# Patient Record
Sex: MALE | Race: WHITE | NOT HISPANIC OR LATINO | URBAN - METROPOLITAN AREA
[De-identification: names, ages, dates, MRNs, and addresses within clinical notes are randomized per-mention and may not be internally consistent; named-entity substitution may affect disease eponyms.]

---

## 2020-06-23 ENCOUNTER — OFFICE VISIT (OUTPATIENT)
Dept: URBAN - METROPOLITAN AREA CLINIC 107 | Facility: CLINIC | Age: 40
End: 2020-06-23

## 2020-07-24 ENCOUNTER — OFFICE VISIT (OUTPATIENT)
Dept: URBAN - METROPOLITAN AREA CLINIC 113 | Facility: CLINIC | Age: 40
End: 2020-07-24

## 2020-07-25 ENCOUNTER — TELEPHONE ENCOUNTER (OUTPATIENT)
Dept: URBAN - METROPOLITAN AREA CLINIC 13 | Facility: CLINIC | Age: 40
End: 2020-07-25

## 2020-07-25 RX ORDER — SACCHAROMYCES BOULARDII 250 MG
TAKE AS DIRECTED CAPSULE ORAL
Refills: 0 | OUTPATIENT
End: 2019-08-20

## 2020-07-25 RX ORDER — ACETYLCYSTEINE 600 MG
TAKE AS DIRECTED CAPSULE ORAL
Refills: 0 | OUTPATIENT
End: 2018-09-11

## 2020-07-25 RX ORDER — DEXTRIN 3 G/3.5 G
TAKE AS DIRECTED POWDER (GRAM) ORAL
Refills: 0 | OUTPATIENT
End: 2019-08-20

## 2020-07-26 ENCOUNTER — TELEPHONE ENCOUNTER (OUTPATIENT)
Dept: URBAN - METROPOLITAN AREA CLINIC 13 | Facility: CLINIC | Age: 40
End: 2020-07-26

## 2020-07-26 RX ORDER — SULFAMETHOXAZOLE AND TRIMETHOPRIM 800; 160 MG/1; MG/1
TAKE ONE TABLET BY MOUTH TWICE A DAY FOR 7 DAYS TABLET ORAL
Qty: 14 | Refills: 0 | Status: ACTIVE | COMMUNITY
Start: 2018-07-20

## 2020-07-26 RX ORDER — KETOCONAZOLE 20 MG/G
APPLY SPARINGLY TO AFFECTED AREA(S) ONCE DAILY CREAM TOPICAL
Refills: 0 | Status: ACTIVE | COMMUNITY
Start: 2019-09-30

## 2020-07-26 RX ORDER — CYCLOBENZAPRINE HYDROCHLORIDE 5 MG/1
TABLET, FILM COATED ORAL
Qty: 30 | Refills: 0 | Status: ACTIVE | COMMUNITY
Start: 2019-03-12

## 2020-07-26 RX ORDER — AMOXICILLIN 500 MG/1
TAKE ONE CAPSULE BY MOUTH THREE TIMES A DAY CAPSULE ORAL
Qty: 30 | Refills: 0 | Status: ACTIVE | COMMUNITY
Start: 2018-08-07

## 2020-07-26 RX ORDER — SODIUM FLUORIDE 6 MG/ML
PASTE, DENTIFRICE DENTAL
Qty: 100 | Refills: 0 | Status: ACTIVE | COMMUNITY
Start: 2019-07-24

## 2020-07-26 RX ORDER — LOTEPREDNOL ETABONATE AND TOBRAMYCIN 5; 3 MG/ML; MG/ML
INSTILL ONE DROP INTO EACH EYE THREE TIMES DAILY FOR 7 DAYS. SHAKE WEL SUSPENSION/ DROPS OPHTHALMIC
Qty: 5 | Refills: 0 | Status: ACTIVE | COMMUNITY
Start: 2019-11-12

## 2020-07-26 RX ORDER — AZITHROMYCIN DIHYDRATE 250 MG/1
TABLET, FILM COATED ORAL
Qty: 6 | Refills: 0 | Status: ACTIVE | COMMUNITY
Start: 2013-05-16

## 2020-07-26 RX ORDER — OFLOXACIN 3 MG/ML
SOLUTION AURICULAR (OTIC)
Qty: 10 | Refills: 0 | Status: ACTIVE | COMMUNITY
Start: 2014-05-14

## 2020-07-26 RX ORDER — ALBUTEROL SULFATE 90 UG/1
AEROSOL, METERED RESPIRATORY (INHALATION)
Qty: 8 | Refills: 0 | Status: ACTIVE | COMMUNITY
Start: 2020-02-19

## 2020-07-26 RX ORDER — PREDNISONE 20 MG/1
TABLET ORAL
Qty: 5 | Refills: 0 | Status: ACTIVE | COMMUNITY
Start: 2019-03-12

## 2020-07-26 RX ORDER — AMOXICILLIN 875 MG/1
TABLET, FILM COATED ORAL
Qty: 20 | Refills: 0 | Status: ACTIVE | COMMUNITY
Start: 2018-02-17

## 2020-09-03 ENCOUNTER — OFFICE VISIT (OUTPATIENT)
Dept: URBAN - METROPOLITAN AREA CLINIC 113 | Facility: CLINIC | Age: 40
End: 2020-09-03
Payer: COMMERCIAL

## 2020-09-03 ENCOUNTER — OFFICE VISIT (OUTPATIENT)
Dept: URBAN - METROPOLITAN AREA CLINIC 113 | Facility: CLINIC | Age: 40
End: 2020-09-03

## 2020-09-03 VITALS
BODY MASS INDEX: 23.52 KG/M2 | RESPIRATION RATE: 20 BRPM | HEART RATE: 72 BPM | SYSTOLIC BLOOD PRESSURE: 126 MMHG | HEIGHT: 71 IN | TEMPERATURE: 98.2 F | WEIGHT: 168 LBS | DIASTOLIC BLOOD PRESSURE: 79 MMHG

## 2020-09-03 DIAGNOSIS — K58.9 IRRITABLE BOWEL SYNDROME: ICD-10-CM

## 2020-09-03 PROCEDURE — G8427 DOCREV CUR MEDS BY ELIG CLIN: HCPCS | Performed by: INTERNAL MEDICINE

## 2020-09-03 PROCEDURE — 99213 OFFICE O/P EST LOW 20 MIN: CPT | Performed by: INTERNAL MEDICINE

## 2020-09-03 RX ORDER — PREDNISONE 20 MG/1
TABLET ORAL
Qty: 5 | Refills: 0 | Status: DISCONTINUED | COMMUNITY
Start: 2019-03-12

## 2020-09-03 RX ORDER — SULFAMETHOXAZOLE AND TRIMETHOPRIM 800; 160 MG/1; MG/1
TAKE ONE TABLET BY MOUTH TWICE A DAY FOR 7 DAYS TABLET ORAL
Qty: 14 | Refills: 0 | Status: DISCONTINUED | COMMUNITY
Start: 2018-07-20

## 2020-09-03 RX ORDER — AZITHROMYCIN DIHYDRATE 250 MG/1
TABLET, FILM COATED ORAL
Qty: 6 | Refills: 0 | Status: DISCONTINUED | COMMUNITY
Start: 2013-05-16

## 2020-09-03 RX ORDER — AMOXICILLIN 875 MG/1
TABLET, FILM COATED ORAL
Qty: 20 | Refills: 0 | Status: DISCONTINUED | COMMUNITY
Start: 2018-02-17

## 2020-09-03 RX ORDER — CYCLOBENZAPRINE HYDROCHLORIDE 5 MG/1
TABLET, FILM COATED ORAL
Qty: 30 | Refills: 0 | Status: DISCONTINUED | COMMUNITY
Start: 2019-03-12

## 2020-09-03 RX ORDER — AMOXICILLIN 500 MG/1
TAKE ONE CAPSULE BY MOUTH THREE TIMES A DAY CAPSULE ORAL
Qty: 30 | Refills: 0 | Status: DISCONTINUED | COMMUNITY
Start: 2018-08-07

## 2020-09-03 RX ORDER — ALBUTEROL SULFATE 90 UG/1
AEROSOL, METERED RESPIRATORY (INHALATION)
Qty: 8 | Refills: 0 | Status: ACTIVE | COMMUNITY
Start: 2020-02-19

## 2020-09-03 RX ORDER — RIFAXIMIN 550 MG/1
1 TABLET TABLET ORAL THREE TIMES A DAY
Qty: 42 TABLET | Refills: 0 | OUTPATIENT
Start: 2020-09-03 | End: 2020-09-17

## 2020-09-03 RX ORDER — LOTEPREDNOL ETABONATE AND TOBRAMYCIN 5; 3 MG/ML; MG/ML
INSTILL ONE DROP INTO EACH EYE THREE TIMES DAILY FOR 7 DAYS. SHAKE WEL SUSPENSION/ DROPS OPHTHALMIC
Qty: 5 | Refills: 0 | Status: ACTIVE | COMMUNITY
Start: 2019-11-12

## 2020-09-03 RX ORDER — KETOCONAZOLE 20 MG/G
APPLY SPARINGLY TO AFFECTED AREA(S) ONCE DAILY CREAM TOPICAL
Refills: 0 | Status: ACTIVE | COMMUNITY
Start: 2019-09-30

## 2020-09-03 RX ORDER — OFLOXACIN 3 MG/ML
SOLUTION AURICULAR (OTIC)
Qty: 10 | Refills: 0 | Status: DISCONTINUED | COMMUNITY
Start: 2014-05-14

## 2020-09-03 RX ORDER — SODIUM FLUORIDE 6 MG/ML
PASTE, DENTIFRICE DENTAL
Qty: 100 | Refills: 0 | Status: DISCONTINUED | COMMUNITY
Start: 2019-07-24

## 2020-09-03 NOTE — HPI-TODAY'S VISIT:
Mr. Mehta is a 39 year old male presenting for follow up. He has diarrhea with "sticky" stools.  He takes fiber, which is helpful.  His bottom is irritated due to the frequent stools.  He usually has 1 loose, but formed stool a day.  He has a history of anal fissure, with intermittent red blood per rectum.  He has avoided meat for the past month, which has provided some relief.  He has abdominal cramping immediately prior to defecation.  He denies any nausea, vomiting or heartburn. He takes a probiotic he buys at [employer].   Over the last 5 years, he has had the same symptoms wax and wane.  He has had labs, imaging, and colonoscopy with Dr. Montesinos.

## 2020-11-04 ENCOUNTER — WEB ENCOUNTER (OUTPATIENT)
Dept: URBAN - METROPOLITAN AREA CLINIC 113 | Facility: CLINIC | Age: 40
End: 2020-11-04

## 2020-11-04 ENCOUNTER — OFFICE VISIT (OUTPATIENT)
Dept: URBAN - METROPOLITAN AREA CLINIC 113 | Facility: CLINIC | Age: 40
End: 2020-11-04
Payer: COMMERCIAL

## 2020-11-04 VITALS
WEIGHT: 166 LBS | BODY MASS INDEX: 23.24 KG/M2 | HEIGHT: 71 IN | DIASTOLIC BLOOD PRESSURE: 81 MMHG | RESPIRATION RATE: 18 BRPM | HEART RATE: 65 BPM | SYSTOLIC BLOOD PRESSURE: 131 MMHG | TEMPERATURE: 98.4 F

## 2020-11-04 DIAGNOSIS — K58.9 IRRITABLE BOWEL SYNDROME: ICD-10-CM

## 2020-11-04 DIAGNOSIS — R19.5 LOOSE STOOLS: ICD-10-CM

## 2020-11-04 PROCEDURE — G8427 DOCREV CUR MEDS BY ELIG CLIN: HCPCS | Performed by: INTERNAL MEDICINE

## 2020-11-04 PROCEDURE — 99213 OFFICE O/P EST LOW 20 MIN: CPT | Performed by: INTERNAL MEDICINE

## 2020-11-04 RX ORDER — LOTEPREDNOL ETABONATE AND TOBRAMYCIN 5; 3 MG/ML; MG/ML
INSTILL ONE DROP INTO EACH EYE THREE TIMES DAILY FOR 7 DAYS. SHAKE WEL SUSPENSION/ DROPS OPHTHALMIC
Qty: 5 | Refills: 0 | Status: DISCONTINUED | COMMUNITY
Start: 2019-11-12

## 2020-11-04 RX ORDER — KETOCONAZOLE 20 MG/G
APPLY SPARINGLY TO AFFECTED AREA(S) ONCE DAILY CREAM TOPICAL
Refills: 0 | Status: DISCONTINUED | COMMUNITY
Start: 2019-09-30

## 2020-11-04 RX ORDER — ALBUTEROL SULFATE 90 UG/1
AEROSOL, METERED RESPIRATORY (INHALATION)
Qty: 8 | Refills: 0 | Status: ACTIVE | COMMUNITY
Start: 2020-02-19

## 2020-11-04 NOTE — HPI-TODAY'S VISIT:
The patient is a very pleasant 40-year-old gentleman who had been followed by Dr. Montesinos for chronic GI symptomatology.  He is undergone colonoscopy in 2014 which was unremarkable for either diverticulosis or Crohn's disease.  He had a CT scan in 2015 which was unremarkable.  He often has loose stools and sticky stools.  He was recently placed on Xifaxan which actually is led to a marked improvement in his symptoms.  He does take psyllium husk every day.  I cannot find where he was ever tested for celiac disease.  His only foreign travel was in 2013 to the [place].  He stated a resort at that time though.  He does not drink from a shallow Wells.

## 2020-11-04 NOTE — EXAM-PHYSICAL EXAM
He is alert and oriented to person place and situation no acute distress.  He has no scleral icterus.

## 2021-01-22 ENCOUNTER — LAB OUTSIDE AN ENCOUNTER (OUTPATIENT)
Dept: URBAN - METROPOLITAN AREA CLINIC 113 | Facility: CLINIC | Age: 41
End: 2021-01-22

## 2021-01-29 ENCOUNTER — OFFICE VISIT (OUTPATIENT)
Dept: URBAN - METROPOLITAN AREA CLINIC 113 | Facility: CLINIC | Age: 41
End: 2021-01-29
Payer: COMMERCIAL

## 2021-01-29 VITALS
HEART RATE: 69 BPM | SYSTOLIC BLOOD PRESSURE: 124 MMHG | TEMPERATURE: 97.7 F | HEIGHT: 71 IN | RESPIRATION RATE: 18 BRPM | WEIGHT: 174 LBS | DIASTOLIC BLOOD PRESSURE: 74 MMHG | BODY MASS INDEX: 24.36 KG/M2

## 2021-01-29 DIAGNOSIS — R13.14 PHARYNGOESOPHAGEAL DYSPHAGIA: ICD-10-CM

## 2021-01-29 PROBLEM — 40739000 DYSPHAGIA: Status: ACTIVE | Noted: 2021-01-29

## 2021-01-29 PROCEDURE — G8482 FLU IMMUNIZE ORDER/ADMIN: HCPCS | Performed by: NURSE PRACTITIONER

## 2021-01-29 PROCEDURE — 99214 OFFICE O/P EST MOD 30 MIN: CPT | Performed by: NURSE PRACTITIONER

## 2021-01-29 RX ORDER — ALBUTEROL SULFATE 90 UG/1
AEROSOL, METERED RESPIRATORY (INHALATION)
Qty: 8 | Refills: 0 | Status: ON HOLD | COMMUNITY
Start: 2020-02-19

## 2021-01-29 NOTE — HPI-TODAY'S VISIT:
39yo male presenting for evaluation of dysphagia.  He was last seen 11/4/20 by Dr. Thomas for a change in stool caliber. He was recommended an alternative fiber supplement, and celiac serologies were planned. Stool studies and/or an empiric course of metronidazole were considered.  Within the last two months, he has experienced progressive difficulty with swallowing. He indicates solid food will become lodged at the sternal notch, resulting in gagging with forced regurgitation. At times, he can facilitate passage with a sip of water. He denies GERD symptoms. He has chronic abdominal pain which has been attributed to irritable bowel syndrome. This has improved following a course of Xifaxan. He denies nausea or vomiting. He has one loose, nonbloody stool per day with psyllium husk and Align. He denies NSAID use.

## 2021-02-05 ENCOUNTER — OFFICE VISIT (OUTPATIENT)
Dept: URBAN - METROPOLITAN AREA CLINIC 113 | Facility: CLINIC | Age: 41
End: 2021-02-05

## 2021-02-15 ENCOUNTER — OFFICE VISIT (OUTPATIENT)
Dept: URBAN - METROPOLITAN AREA CLINIC 113 | Facility: CLINIC | Age: 41
End: 2021-02-15

## 2021-02-17 ENCOUNTER — OFFICE VISIT (OUTPATIENT)
Dept: URBAN - METROPOLITAN AREA SURGERY CENTER 25 | Facility: SURGERY CENTER | Age: 41
End: 2021-02-17
Payer: COMMERCIAL

## 2021-02-17 ENCOUNTER — CLAIMS CREATED FROM THE CLAIM WINDOW (OUTPATIENT)
Dept: URBAN - METROPOLITAN AREA CLINIC 4 | Facility: CLINIC | Age: 41
End: 2021-02-17
Payer: COMMERCIAL

## 2021-02-17 DIAGNOSIS — K21.00 GASTRO-ESOPHAGEAL REFLUX DISEASE WITH ESOPHAGITIS, WITHOUT BLEEDING: ICD-10-CM

## 2021-02-17 DIAGNOSIS — K21.9 ACID REFLUX: ICD-10-CM

## 2021-02-17 PROCEDURE — G8907 PT DOC NO EVENTS ON DISCHARG: HCPCS | Performed by: INTERNAL MEDICINE

## 2021-02-17 PROCEDURE — 88305 TISSUE EXAM BY PATHOLOGIST: CPT | Performed by: PATHOLOGY

## 2021-02-17 PROCEDURE — 88312 SPECIAL STAINS GROUP 1: CPT | Performed by: PATHOLOGY

## 2021-02-17 PROCEDURE — 43239 EGD BIOPSY SINGLE/MULTIPLE: CPT | Performed by: INTERNAL MEDICINE

## 2021-02-17 RX ORDER — ALBUTEROL SULFATE 90 UG/1
AEROSOL, METERED RESPIRATORY (INHALATION)
Qty: 8 | Refills: 0 | Status: ON HOLD | COMMUNITY
Start: 2020-02-19

## 2021-03-22 ENCOUNTER — OFFICE VISIT (OUTPATIENT)
Dept: URBAN - METROPOLITAN AREA CLINIC 107 | Facility: CLINIC | Age: 41
End: 2021-03-22

## 2021-05-12 ENCOUNTER — DASHBOARD ENCOUNTERS (OUTPATIENT)
Age: 41
End: 2021-05-12

## 2021-05-12 ENCOUNTER — OFFICE VISIT (OUTPATIENT)
Dept: URBAN - METROPOLITAN AREA CLINIC 113 | Facility: CLINIC | Age: 41
End: 2021-05-12
Payer: COMMERCIAL

## 2021-05-12 VITALS
TEMPERATURE: 97.5 F | HEIGHT: 71 IN | WEIGHT: 180 LBS | DIASTOLIC BLOOD PRESSURE: 72 MMHG | SYSTOLIC BLOOD PRESSURE: 119 MMHG | HEART RATE: 70 BPM | BODY MASS INDEX: 25.2 KG/M2

## 2021-05-12 DIAGNOSIS — R13.10 ESOPHAGEAL DYSPHAGIA: ICD-10-CM

## 2021-05-12 DIAGNOSIS — K21.00 GASTRO-ESOPHAGEAL REFLUX DISEASE WITH ESOPHAGITIS, WITHOUT BLEEDING: ICD-10-CM

## 2021-05-12 DIAGNOSIS — K58.9 IRRITABLE BOWEL SYNDROME: ICD-10-CM

## 2021-05-12 PROBLEM — 40890009: Status: ACTIVE | Noted: 2021-05-12

## 2021-05-12 PROCEDURE — 99213 OFFICE O/P EST LOW 20 MIN: CPT | Performed by: INTERNAL MEDICINE

## 2021-05-12 RX ORDER — ALBUTEROL SULFATE 90 UG/1
AEROSOL, METERED RESPIRATORY (INHALATION)
Qty: 8 | Refills: 0 | Status: ON HOLD | COMMUNITY
Start: 2020-02-19

## 2021-05-12 NOTE — HPI-TODAY'S VISIT:
41yo male presenting for evaluation of dysphagia.  He was last seen 11/4/20 by Dr. Thomas for a change in stool caliber. He was recommended an alternative fiber supplement, and celiac serologies were planned. Stool studies and/or an empiric course of metronidazole were considered.  Within the last two months, he has experienced progressive difficulty with swallowing. He indicates solid food will become lodged at the sternal notch, resulting in gagging with forced regurgitation. At times, he can facilitate passage with a sip of water. He denies GERD symptoms. He has chronic abdominal pain which has been attributed to irritable bowel syndrome. This has improved following a course of Xifaxan. He denies nausea or vomiting. He has one loose, nonbloody stool per day with psyllium husk and Align. He denies NSAID use. The patient is a very pleasant 40-year-old gentleman who had been followed by Dr. Montesinos for chronic GI symptomatology.  He is undergone colonoscopy in 2014 which was unremarkable for either diverticulosis or Crohn's disease.  He had a CT scan in 2015 which was unremarkable.  He often has loose stools and sticky stools.  He was recently placed on Xifaxan which actually is led to a marked improvement in his symptoms.  He does take psyllium husk every day.  I cannot find where he was ever tested for celiac disease.  His only foreign travel was in 2013 to the [place].  He stated a resort at that time though.  He does not drink from a shallow Wells. Barium swallow in February revealed a small hiatal hernia with Schatzki's ring.  Tablet passed without delay.  There was mild acid reflux.  Upper endoscopy was then performed on February 17 of this year.  Examination was unremarkable.  Biopsies of the esophagus were obtained.  Biopsies revealed mild reflux changes but no evidence for eosinophilic esophagitis. The patient states he has been doing very well with lifestyle modifications.  He states he recently had some problems of low back discomfort that would go down to his rectum and a pressure sensation in his rectum but no passage of stool.  This would often last for about an hour.  Has happened a couple of times.  He does take a fiber supplement of some psyllium husk of about a teaspoon a day.  We discussed increasing this volume.

## 2021-06-07 LAB
T-TRANSGLUTAMINASE (TTG) IGA: <2
T-TRANSGLUTAMINASE (TTG) IGG: <2

## 2022-02-18 ENCOUNTER — OFFICE VISIT (OUTPATIENT)
Dept: INTERNAL MEDICINE CLINIC | Facility: CLINIC | Age: 42
End: 2022-02-18
Payer: COMMERCIAL

## 2022-02-18 VITALS
TEMPERATURE: 98 F | HEART RATE: 72 BPM | HEIGHT: 71 IN | DIASTOLIC BLOOD PRESSURE: 66 MMHG | WEIGHT: 189 LBS | BODY MASS INDEX: 26.46 KG/M2 | SYSTOLIC BLOOD PRESSURE: 126 MMHG | RESPIRATION RATE: 17 BRPM | OXYGEN SATURATION: 99 %

## 2022-02-18 DIAGNOSIS — Z00.00 ANNUAL PHYSICAL EXAM: Primary | ICD-10-CM

## 2022-02-18 DIAGNOSIS — K58.2 IRRITABLE BOWEL SYNDROME WITH BOTH CONSTIPATION AND DIARRHEA: ICD-10-CM

## 2022-02-18 DIAGNOSIS — M25.511 CHRONIC RIGHT SHOULDER PAIN: ICD-10-CM

## 2022-02-18 DIAGNOSIS — G89.29 CHRONIC RIGHT SHOULDER PAIN: ICD-10-CM

## 2022-02-18 LAB
ALBUMIN SERPL-MCNC: 4 G/DL (ref 3.4–5)
ALBUMIN/GLOB SERPL: 1.3 {RATIO} (ref 1–2)
ALP LIVER SERPL-CCNC: 54 U/L
ALT SERPL-CCNC: 39 U/L
ANION GAP SERPL CALC-SCNC: 2 MMOL/L (ref 0–18)
AST SERPL-CCNC: 24 U/L (ref 15–37)
BASOPHILS # BLD AUTO: 0.06 X10(3) UL (ref 0–0.2)
BASOPHILS NFR BLD AUTO: 0.9 %
BILIRUB SERPL-MCNC: 0.7 MG/DL (ref 0.1–2)
BILIRUB UR QL: NEGATIVE
BUN BLD-MCNC: 13 MG/DL (ref 7–18)
BUN/CREAT SERPL: 15.1 (ref 10–20)
CALCIUM BLD-MCNC: 9.1 MG/DL (ref 8.5–10.1)
CHLORIDE SERPL-SCNC: 106 MMOL/L (ref 98–112)
CHOLEST SERPL-MCNC: 173 MG/DL (ref ?–200)
CLARITY UR: CLEAR
CO2 SERPL-SCNC: 31 MMOL/L (ref 21–32)
COLOR UR: YELLOW
CREAT BLD-MCNC: 0.86 MG/DL
DEPRECATED RDW RBC AUTO: 44.1 FL (ref 35.1–46.3)
EOSINOPHIL # BLD AUTO: 0.28 X10(3) UL (ref 0–0.7)
EOSINOPHIL NFR BLD AUTO: 4.2 %
FASTING PATIENT LIPID ANSWER: YES
FASTING STATUS PATIENT QL REPORTED: YES
GLOBULIN PLAS-MCNC: 3 G/DL (ref 2.8–4.4)
GLUCOSE BLD-MCNC: 93 MG/DL (ref 70–99)
GLUCOSE UR-MCNC: NEGATIVE MG/DL
HCT VFR BLD AUTO: 46.7 %
HDLC SERPL-MCNC: 53 MG/DL (ref 40–59)
HGB BLD-MCNC: 15.1 G/DL
HGB UR QL STRIP.AUTO: NEGATIVE
IMM GRANULOCYTES # BLD AUTO: 0.01 X10(3) UL (ref 0–1)
IMM GRANULOCYTES NFR BLD: 0.1 %
KETONES UR-MCNC: NEGATIVE MG/DL
LDLC SERPL CALC-MCNC: 109 MG/DL (ref ?–100)
LEUKOCYTE ESTERASE UR QL STRIP.AUTO: NEGATIVE
LYMPHOCYTES # BLD AUTO: 1.96 X10(3) UL (ref 1–4)
LYMPHOCYTES NFR BLD AUTO: 29.2 %
MCH RBC QN AUTO: 31.1 PG (ref 26–34)
MCHC RBC AUTO-ENTMCNC: 32.3 G/DL (ref 31–37)
MCV RBC AUTO: 96.3 FL
MONOCYTES # BLD AUTO: 0.63 X10(3) UL (ref 0.1–1)
MONOCYTES NFR BLD AUTO: 9.4 %
NEUTROPHILS # BLD AUTO: 3.77 X10 (3) UL (ref 1.5–7.7)
NEUTROPHILS # BLD AUTO: 3.77 X10(3) UL (ref 1.5–7.7)
NEUTROPHILS NFR BLD AUTO: 56.2 %
NITRITE UR QL STRIP.AUTO: NEGATIVE
NONHDLC SERPL-MCNC: 120 MG/DL (ref ?–130)
OSMOLALITY SERPL CALC.SUM OF ELEC: 288 MOSM/KG (ref 275–295)
PH UR: 7 [PH] (ref 5–8)
PLATELET # BLD AUTO: 165 10(3)UL (ref 150–450)
POTASSIUM SERPL-SCNC: 4.4 MMOL/L (ref 3.5–5.1)
PROT SERPL-MCNC: 7 G/DL (ref 6.4–8.2)
PROT UR-MCNC: NEGATIVE MG/DL
RBC # BLD AUTO: 4.85 X10(6)UL
SODIUM SERPL-SCNC: 139 MMOL/L (ref 136–145)
SP GR UR STRIP: 1.02 (ref 1–1.03)
TRIGL SERPL-MCNC: 54 MG/DL (ref 30–149)
TSI SER-ACNC: 1.7 MIU/ML (ref 0.36–3.74)
UROBILINOGEN UR STRIP-ACNC: <2
VLDLC SERPL CALC-MCNC: 9 MG/DL (ref 0–30)
WBC # BLD AUTO: 6.7 X10(3) UL (ref 4–11)

## 2022-02-18 PROCEDURE — 3078F DIAST BP <80 MM HG: CPT | Performed by: INTERNAL MEDICINE

## 2022-02-18 PROCEDURE — 99386 PREV VISIT NEW AGE 40-64: CPT | Performed by: INTERNAL MEDICINE

## 2022-02-18 PROCEDURE — 36415 COLL VENOUS BLD VENIPUNCTURE: CPT | Performed by: INTERNAL MEDICINE

## 2022-02-18 PROCEDURE — 3008F BODY MASS INDEX DOCD: CPT | Performed by: INTERNAL MEDICINE

## 2022-02-18 PROCEDURE — 3074F SYST BP LT 130 MM HG: CPT | Performed by: INTERNAL MEDICINE

## 2022-04-07 ENCOUNTER — TELEPHONE (OUTPATIENT)
Dept: GASTROENTEROLOGY | Facility: CLINIC | Age: 42
End: 2022-04-07

## 2022-04-07 ENCOUNTER — OFFICE VISIT (OUTPATIENT)
Dept: GASTROENTEROLOGY | Facility: CLINIC | Age: 42
End: 2022-04-07
Payer: COMMERCIAL

## 2022-04-07 VITALS
DIASTOLIC BLOOD PRESSURE: 77 MMHG | BODY MASS INDEX: 25.48 KG/M2 | WEIGHT: 182 LBS | HEART RATE: 76 BPM | SYSTOLIC BLOOD PRESSURE: 146 MMHG | HEIGHT: 71 IN

## 2022-04-07 DIAGNOSIS — R10.10 UPPER ABDOMINAL PAIN: ICD-10-CM

## 2022-04-07 DIAGNOSIS — R13.19 ESOPHAGEAL DYSPHAGIA: ICD-10-CM

## 2022-04-07 DIAGNOSIS — R19.8 CHANGE IN BOWEL MOVEMENT: Primary | ICD-10-CM

## 2022-04-07 DIAGNOSIS — K21.9 GASTROESOPHAGEAL REFLUX DISEASE, UNSPECIFIED WHETHER ESOPHAGITIS PRESENT: ICD-10-CM

## 2022-04-07 PROCEDURE — 3078F DIAST BP <80 MM HG: CPT | Performed by: NURSE PRACTITIONER

## 2022-04-07 PROCEDURE — 3008F BODY MASS INDEX DOCD: CPT | Performed by: NURSE PRACTITIONER

## 2022-04-07 PROCEDURE — 3077F SYST BP >= 140 MM HG: CPT | Performed by: NURSE PRACTITIONER

## 2022-04-07 PROCEDURE — 99244 OFF/OP CNSLTJ NEW/EST MOD 40: CPT | Performed by: NURSE PRACTITIONER

## 2022-04-07 RX ORDER — MELOXICAM 15 MG/1
TABLET ORAL
COMMUNITY

## 2022-04-07 RX ORDER — POLYETHYLENE GLYCOL 3350, SODIUM CHLORIDE, SODIUM BICARBONATE, POTASSIUM CHLORIDE 420; 11.2; 5.72; 1.48 G/4L; G/4L; G/4L; G/4L
POWDER, FOR SOLUTION ORAL
Qty: 4000 ML | Refills: 0 | Status: SHIPPED | OUTPATIENT
Start: 2022-04-07

## 2022-04-07 RX ORDER — PANTOPRAZOLE SODIUM 40 MG/1
40 TABLET, DELAYED RELEASE ORAL
Qty: 30 TABLET | Refills: 1 | Status: SHIPPED | OUTPATIENT
Start: 2022-04-07

## 2022-04-07 NOTE — TELEPHONE ENCOUNTER
Scheduled for:  Colonoscopy 26978  EGD 85100  Provider Name:  Dr Katya Watkins  Date:  Tuesday, 05/31/2022  Location:  [facility]  Sedation:  MAC  Time:  9:30am ((pt is aware that Ari 150 will call the day before to confirm arrival time)  Prep:  Trilyte   Meds/Allergies Reconciled?:  SVETLANA Shepherd Reviewed   Diagnosis with codes:  GERD K21.9; Dysphagia R13.10; Upper Abdominal Pain R10.10; Change in bowel habits R19.4  Was patient informed to call insurance with codes (Y/N):  Yes  Referral sent?:  yes  [facility] or [id number] notified?:  Electronic case request was sent to Ari Bello via [employer]. Medication Orders:  Pt is aware to NOT take iron pills, herbal meds and diet supplements for 7 days before exam. Also to NOT take any form of alcohol, recreational drugs and any forms of ED meds 24 hours before exam.   Misc Orders:  Patient was informed that they will need a COVID 19 test prior to their procedure. Patient verbally understood & will await a phone call from [facility] to schedule. Further instructions given by staff:  I provide prep instructions to patient at the time of the appointment and reviewed date, time and location, he verbalized that he understood and is aware to call if he has any questions.

## 2022-05-31 ENCOUNTER — TELEPHONE (OUTPATIENT)
Dept: GASTROENTEROLOGY | Facility: CLINIC | Age: 42
End: 2022-05-31

## 2022-05-31 ENCOUNTER — SURGERY CENTER DOCUMENTATION (OUTPATIENT)
Dept: SURGERY | Age: 42
End: 2022-05-31

## 2022-05-31 ENCOUNTER — LAB REQUISITION (OUTPATIENT)
Dept: SURGERY | Age: 42
End: 2022-05-31
Payer: COMMERCIAL

## 2022-05-31 DIAGNOSIS — R19.4 CHANGE IN BOWEL HABITS: ICD-10-CM

## 2022-05-31 DIAGNOSIS — R10.10 UPPER ABDOMINAL PAIN: ICD-10-CM

## 2022-05-31 DIAGNOSIS — K21.9 GASTROESOPHAGEAL REFLUX DISEASE: ICD-10-CM

## 2022-05-31 DIAGNOSIS — R13.10 PROBLEMS WITH SWALLOWING AND MASTICATION: ICD-10-CM

## 2022-05-31 PROCEDURE — 43239 EGD BIOPSY SINGLE/MULTIPLE: CPT | Performed by: INTERNAL MEDICINE

## 2022-05-31 PROCEDURE — 88305 TISSUE EXAM BY PATHOLOGIST: CPT | Performed by: INTERNAL MEDICINE

## 2022-05-31 PROCEDURE — 43249 ESOPH EGD DILATION <30 MM: CPT | Performed by: INTERNAL MEDICINE

## 2022-05-31 PROCEDURE — 88312 SPECIAL STAINS GROUP 1: CPT | Performed by: INTERNAL MEDICINE

## 2022-05-31 PROCEDURE — 45380 COLONOSCOPY AND BIOPSY: CPT | Performed by: INTERNAL MEDICINE

## 2022-05-31 RX ORDER — PANTOPRAZOLE SODIUM 40 MG/1
40 TABLET, DELAYED RELEASE ORAL
Qty: 90 TABLET | Refills: 1 | Status: SHIPPED | OUTPATIENT
Start: 2022-05-31 | End: 2022-11-27

## 2022-06-01 ENCOUNTER — APPOINTMENT (OUTPATIENT)
Dept: GENERAL RADIOLOGY | Facility: HOSPITAL | Age: 42
End: 2022-06-01
Attending: EMERGENCY MEDICINE
Payer: COMMERCIAL

## 2022-06-01 ENCOUNTER — HOSPITAL ENCOUNTER (EMERGENCY)
Facility: HOSPITAL | Age: 42
Discharge: HOME | End: 2022-06-01
Attending: EMERGENCY MEDICINE
Payer: COMMERCIAL

## 2022-06-01 ENCOUNTER — TELEPHONE (OUTPATIENT)
Dept: GASTROENTEROLOGY | Facility: CLINIC | Age: 42
End: 2022-06-01

## 2022-06-01 VITALS
RESPIRATION RATE: 18 BRPM | SYSTOLIC BLOOD PRESSURE: 124 MMHG | DIASTOLIC BLOOD PRESSURE: 70 MMHG | HEART RATE: 70 BPM | OXYGEN SATURATION: 98 % | BODY MASS INDEX: 24.92 KG/M2 | HEIGHT: 71 IN | TEMPERATURE: 100 F | WEIGHT: 178 LBS

## 2022-06-01 DIAGNOSIS — R50.9 FEVER, UNKNOWN ORIGIN: Primary | ICD-10-CM

## 2022-06-01 LAB
ANION GAP SERPL CALC-SCNC: 6 MMOL/L (ref 0–18)
BASOPHILS # BLD AUTO: 0.03 X10(3) UL (ref 0–0.2)
BASOPHILS NFR BLD AUTO: 0.5 %
BILIRUB UR QL: NEGATIVE
BUN BLD-MCNC: 11 MG/DL (ref 7–18)
BUN/CREAT SERPL: 12 (ref 10–20)
CALCIUM BLD-MCNC: 8.5 MG/DL (ref 8.5–10.1)
CHLORIDE SERPL-SCNC: 108 MMOL/L (ref 98–112)
CLARITY UR: CLEAR
CO2 SERPL-SCNC: 27 MMOL/L (ref 21–32)
COLOR UR: YELLOW
CREAT BLD-MCNC: 0.92 MG/DL
DEPRECATED RDW RBC AUTO: 41.9 FL (ref 35.1–46.3)
EOSINOPHIL # BLD AUTO: 0.25 X10(3) UL (ref 0–0.7)
EOSINOPHIL NFR BLD AUTO: 3.9 %
ERYTHROCYTE [DISTWIDTH] IN BLOOD BY AUTOMATED COUNT: 12.2 % (ref 11–15)
GLUCOSE BLD-MCNC: 104 MG/DL (ref 70–99)
GLUCOSE UR-MCNC: NEGATIVE MG/DL
HCT VFR BLD AUTO: 39.2 %
HGB BLD-MCNC: 13 G/DL
HGB UR QL STRIP.AUTO: NEGATIVE
IMM GRANULOCYTES # BLD AUTO: 0.01 X10(3) UL (ref 0–1)
IMM GRANULOCYTES NFR BLD: 0.2 %
KETONES UR-MCNC: 20 MG/DL
LACTATE SERPL-SCNC: 0.8 MMOL/L (ref 0.4–2)
LEUKOCYTE ESTERASE UR QL STRIP.AUTO: NEGATIVE
LYMPHOCYTES # BLD AUTO: 1.08 X10(3) UL (ref 1–4)
LYMPHOCYTES NFR BLD AUTO: 17 %
MCH RBC QN AUTO: 31 PG (ref 26–34)
MCHC RBC AUTO-ENTMCNC: 33.2 G/DL (ref 31–37)
MCV RBC AUTO: 93.6 FL
MONOCYTES # BLD AUTO: 0.89 X10(3) UL (ref 0.1–1)
MONOCYTES NFR BLD AUTO: 14 %
NEUTROPHILS # BLD AUTO: 4.1 X10 (3) UL (ref 1.5–7.7)
NEUTROPHILS # BLD AUTO: 4.1 X10(3) UL (ref 1.5–7.7)
NEUTROPHILS NFR BLD AUTO: 64.4 %
NITRITE UR QL STRIP.AUTO: NEGATIVE
OSMOLALITY SERPL CALC.SUM OF ELEC: 292 MOSM/KG (ref 275–295)
PH UR: 6 [PH] (ref 5–8)
PLATELET # BLD AUTO: 140 10(3)UL (ref 150–450)
POTASSIUM SERPL-SCNC: 3.8 MMOL/L (ref 3.5–5.1)
PROT UR-MCNC: NEGATIVE MG/DL
RBC # BLD AUTO: 4.19 X10(6)UL
SARS-COV-2 RNA RESP QL NAA+PROBE: NOT DETECTED
SODIUM SERPL-SCNC: 141 MMOL/L (ref 136–145)
SP GR UR STRIP: 1.02 (ref 1–1.03)
UROBILINOGEN UR STRIP-ACNC: <2
VIT C UR-MCNC: NEGATIVE MG/DL
WBC # BLD AUTO: 6.4 X10(3) UL (ref 4–11)

## 2022-06-01 PROCEDURE — 99283 EMERGENCY DEPT VISIT LOW MDM: CPT

## 2022-06-01 PROCEDURE — 36415 COLL VENOUS BLD VENIPUNCTURE: CPT

## 2022-06-01 PROCEDURE — 81003 URINALYSIS AUTO W/O SCOPE: CPT | Performed by: EMERGENCY MEDICINE

## 2022-06-01 PROCEDURE — 80048 BASIC METABOLIC PNL TOTAL CA: CPT | Performed by: EMERGENCY MEDICINE

## 2022-06-01 PROCEDURE — 85025 COMPLETE CBC W/AUTO DIFF WBC: CPT | Performed by: EMERGENCY MEDICINE

## 2022-06-01 PROCEDURE — 71045 X-RAY EXAM CHEST 1 VIEW: CPT | Performed by: EMERGENCY MEDICINE

## 2022-06-01 PROCEDURE — 83605 ASSAY OF LACTIC ACID: CPT | Performed by: EMERGENCY MEDICINE

## 2022-06-01 NOTE — ED INITIAL ASSESSMENT (HPI)
Pt to ED with c/o fever after having EGD-dilation/ & colonoscopy-biopsy today with Dr. Blayne Bower. Pt was suggested to come to ED if he had any fevers.

## 2022-06-01 NOTE — TELEPHONE ENCOUNTER
Patient called overnight with fever  Throat pain gone and no stomach pain  Had cough prior to procedure, COVID was negative  Now fever 102.  Discussed though less likely would worry about perforation and should get evaluated in ER    Did go to ER had CXR and labs  Fever was down after NSAIDs  abd was benign  Did not get called by ER but did review records  No back pain    Sent home to return if worsening    GI staff please follow up today

## 2022-06-01 NOTE — TELEPHONE ENCOUNTER
Dr. Ashlee Lyn--    manda    Spoke with Jennifer Delgado for symptom update s/p ER. Checked temperature when on the phone; down to 98. Woke up with slight headache but took tylenol and resolved. Denies abdominal pain. No fever / chills. No cp, sob, dizziness, lightheadedness, fatigue. Slight cough which is mild. Throat sore but not bothersome. Feels slightly dehydrated but believes probably from prep solution he drank yesterday. Aware is symptoms decline to contact office and/or report back to ER. No additional questions or concerns at present.      Thank you

## 2022-06-03 ENCOUNTER — TELEPHONE (OUTPATIENT)
Dept: GASTROENTEROLOGY | Facility: CLINIC | Age: 42
End: 2022-06-03

## 2022-06-03 NOTE — TELEPHONE ENCOUNTER
Cough unrelated to procedure as was present prior. Can try cough suppressant if ongoing and worse but coughing should not make tear worse. Retching could cause tearing. Follow up with primary about cough and further workup, especially if getting worse and ongoing fevers.      ANA Fiore

## 2022-06-03 NOTE — TELEPHONE ENCOUNTER
Dr. Heck Corey--    Since last conversation--increase in cough production / constant nagging cough. Severe post-nasal drip and congestion. Denies fever / chills. No cp, fatigue, weakness, sob. Throat feels ok. Denies difficulty swallowing. Taking Mucinex and decongestant. \"Coughing so hard\" concerned he'll disrupt dilatation done. Worried may be causing damage to the area. No blood evident when coughing but has a hard, aggressive constant cough. Encouraged fluids, rest and taking it easy. Tylenol as needed. Requesting input from MD on how to proceed. Thank you!

## 2022-06-03 NOTE — TELEPHONE ENCOUNTER
Patient indicates he has a bad cough and would like to speak with nurse. Patient recently had a dialation,  please call at [phone],[facility].

## 2022-06-03 NOTE — TELEPHONE ENCOUNTER
Spoke with She Holloway and relayed Dr. Guido Roland recommendations in it's entirely as illustrated. Reiterated if cough becomes problematic to reach out to pcp for further advisement. Expressed understanding with no additional questions or concerns.

## 2022-06-06 ENCOUNTER — TELEPHONE (OUTPATIENT)
Dept: INTERNAL MEDICINE CLINIC | Facility: CLINIC | Age: 42
End: 2022-06-06

## 2022-06-06 ENCOUNTER — OFFICE VISIT (OUTPATIENT)
Dept: INTERNAL MEDICINE CLINIC | Facility: CLINIC | Age: 42
End: 2022-06-06
Payer: COMMERCIAL

## 2022-06-06 VITALS
SYSTOLIC BLOOD PRESSURE: 122 MMHG | HEIGHT: 71 IN | RESPIRATION RATE: 18 BRPM | BODY MASS INDEX: 24.92 KG/M2 | OXYGEN SATURATION: 97 % | WEIGHT: 178 LBS | HEART RATE: 78 BPM | TEMPERATURE: 97 F | DIASTOLIC BLOOD PRESSURE: 66 MMHG

## 2022-06-06 DIAGNOSIS — R06.2 WHEEZING: ICD-10-CM

## 2022-06-06 DIAGNOSIS — J18.9 PNEUMONIA OF BOTH LOWER LOBES DUE TO INFECTIOUS ORGANISM: Primary | ICD-10-CM

## 2022-06-06 DIAGNOSIS — R05.9 COUGH: ICD-10-CM

## 2022-06-06 PROCEDURE — 3074F SYST BP LT 130 MM HG: CPT | Performed by: INTERNAL MEDICINE

## 2022-06-06 PROCEDURE — 99214 OFFICE O/P EST MOD 30 MIN: CPT | Performed by: INTERNAL MEDICINE

## 2022-06-06 PROCEDURE — 3078F DIAST BP <80 MM HG: CPT | Performed by: INTERNAL MEDICINE

## 2022-06-06 PROCEDURE — 3008F BODY MASS INDEX DOCD: CPT | Performed by: INTERNAL MEDICINE

## 2022-06-06 RX ORDER — BENZONATATE 100 MG/1
100 CAPSULE ORAL 3 TIMES DAILY PRN
Qty: 20 CAPSULE | Refills: 0 | Status: SHIPPED | OUTPATIENT
Start: 2022-06-06 | End: 2022-06-13

## 2022-06-06 RX ORDER — ALBUTEROL SULFATE 90 UG/1
2 AEROSOL, METERED RESPIRATORY (INHALATION) EVERY 6 HOURS PRN
Qty: 1 EACH | Refills: 0 | Status: SHIPPED | OUTPATIENT
Start: 2022-06-06

## 2022-06-06 RX ORDER — AMOXICILLIN AND CLAVULANATE POTASSIUM 875; 125 MG/1; MG/1
1 TABLET, FILM COATED ORAL 2 TIMES DAILY
Qty: 20 TABLET | Refills: 0 | Status: SHIPPED | OUTPATIENT
Start: 2022-06-06 | End: 2022-06-16

## 2022-06-06 NOTE — TELEPHONE ENCOUNTER
Spoke to patient. Dr. Cheyanne Angela approved him to come into office for appointment. Notified patient. Changed appointment in [address]

## 2022-06-06 NOTE — TELEPHONE ENCOUNTER
Spoke to patient. He was recently in ER where he was put under anesthesia for esophageal dilation. He had a cough/cold prior to anesthesia and feels like being under anesthesia made him worse. He took 2 Covid tests and they were negative. His sputum is now yellow. He does not have a fever or SOB. He is asking if he can come into the office because he did have 2 recent Covid tests. Currently scheduled as video visit. Future Appointments   Date Time Provider Manny Reina   6/6/2022 10:45 AM Ambar Velazquez MD [facility]   6/23/2022  8:15 AM [facility] 2 Roni Butler     Dr. Kelly Fiore, please advise if patient can come into office at 10:45 today.

## 2022-06-13 ENCOUNTER — OFFICE VISIT (OUTPATIENT)
Dept: INTERNAL MEDICINE CLINIC | Facility: CLINIC | Age: 42
End: 2022-06-13
Payer: COMMERCIAL

## 2022-06-13 VITALS
RESPIRATION RATE: 16 BRPM | BODY MASS INDEX: 25.06 KG/M2 | TEMPERATURE: 98 F | OXYGEN SATURATION: 98 % | WEIGHT: 179 LBS | SYSTOLIC BLOOD PRESSURE: 108 MMHG | HEART RATE: 76 BPM | DIASTOLIC BLOOD PRESSURE: 78 MMHG | HEIGHT: 71 IN

## 2022-06-13 DIAGNOSIS — J18.9 PNEUMONIA OF BOTH LOWER LOBES DUE TO INFECTIOUS ORGANISM: Primary | ICD-10-CM

## 2022-06-13 PROCEDURE — 3074F SYST BP LT 130 MM HG: CPT | Performed by: INTERNAL MEDICINE

## 2022-06-13 PROCEDURE — 99213 OFFICE O/P EST LOW 20 MIN: CPT | Performed by: INTERNAL MEDICINE

## 2022-06-13 PROCEDURE — 3078F DIAST BP <80 MM HG: CPT | Performed by: INTERNAL MEDICINE

## 2022-06-13 PROCEDURE — 3008F BODY MASS INDEX DOCD: CPT | Performed by: INTERNAL MEDICINE

## 2022-06-23 ENCOUNTER — HOSPITAL ENCOUNTER (OUTPATIENT)
Dept: ULTRASOUND IMAGING | Facility: HOSPITAL | Age: 42
Discharge: HOME | End: 2022-06-23
Attending: NURSE PRACTITIONER
Payer: COMMERCIAL

## 2022-06-23 DIAGNOSIS — R10.10 UPPER ABDOMINAL PAIN: ICD-10-CM

## 2022-06-23 DIAGNOSIS — K21.9 GASTROESOPHAGEAL REFLUX DISEASE, UNSPECIFIED WHETHER ESOPHAGITIS PRESENT: ICD-10-CM

## 2022-06-23 DIAGNOSIS — R13.19 ESOPHAGEAL DYSPHAGIA: ICD-10-CM

## 2022-06-23 DIAGNOSIS — R19.8 CHANGE IN BOWEL MOVEMENT: ICD-10-CM

## 2022-06-23 PROCEDURE — 76700 US EXAM ABDOM COMPLETE: CPT | Performed by: NURSE PRACTITIONER

## 2022-06-29 ENCOUNTER — TELEPHONE (OUTPATIENT)
Dept: GASTROENTEROLOGY | Facility: CLINIC | Age: 42
End: 2022-06-29

## 2022-06-29 DIAGNOSIS — K20.0 EOSINOPHILIC ESOPHAGITIS: ICD-10-CM

## 2022-06-29 DIAGNOSIS — R13.10 DYSPHAGIA, UNSPECIFIED TYPE: Primary | ICD-10-CM

## 2022-06-29 NOTE — TELEPHONE ENCOUNTER
Entered into Epic. Recall CLN in 7 years per Dr. Lucian Silveira. Last CLN done 05/31/2022. Recall entered into Patient Outreach for 05/31/2029. Health Maintenance updated. Routed alternative telephone encounter to schedulers to assist with schedule EGD 8-12 week f/u.

## 2022-06-29 NOTE — TELEPHONE ENCOUNTER
GI Schedulers--    Please assist with scheduling repeat EGD w/ dil in 8-12 weeks per Dr. Ruthann Luque result note attached below. Dr. Robinson Suarez--     Please provide orders for scheduling repeat endoscopy. Thank you!

## 2022-06-29 NOTE — TELEPHONE ENCOUNTER
----- Message from Adonis Saldaña MD sent at 6/29/2022  3:37 PM CDT -----  GI staff: please place recall for colonoscopy in 7 years     Repeat EGD in 8-12 weeks for repeat EGD with dilation

## 2022-06-30 NOTE — TELEPHONE ENCOUNTER
Patient scheduled in epic and case submitted to endo patient is awae of Npo instructions        Scheduled for: EGD 01097   Provider Name:  Dr Sydnie Muller  Date:  09/28/2022  Location:[facility]      Sedation:  MAC  Time:  0730 (pt is aware to arrive at 0630)   Prep: NPO   Meds/Allergies Reconciled?:  Physician reviewed   Diagnosis with codes:    Was patient informed to call insurance with codes (Y/N):  Yes, I confirmed [employer] PPO insurance with the patient. Referral sent?:  N/A  EM or [facility] notified?:  I sent an electronic request to Endo Scheduling and received a confirmation today. Medication Orders: This patient verbally confirmed that he is not taking:   Iron, blood thinners, BP meds, and is not diabetic   Not latex allergy, Not PCN allergy and does not have a pacemaker     Misc Orders:   Patient is aware he will be scheduled for a covid 19 test prior to procedure       Further instructions given by staff:  This patient had no questions regarding the prep instructions

## 2022-06-30 NOTE — TELEPHONE ENCOUNTER
-Schedule EGD w/ possible biopsy/dilation w/ MAC for EoE follow up and dysphagia   - NO alcohol, recreational drugs nor erectile dysfunction mediations 24 hours before procedure(s)   - NO herbal supplements or weight loss medications x 7 days prior to the procedure(s)    ** If MAC @ [facility] or IV twilight - continue all medications as prescribed      COVID testing per protocol

## 2022-07-07 ENCOUNTER — TELEPHONE (OUTPATIENT)
Dept: GASTROENTEROLOGY | Facility: CLINIC | Age: 42
End: 2022-07-07

## 2022-07-07 DIAGNOSIS — K76.9 LIVER LESION: Primary | ICD-10-CM

## 2022-07-07 NOTE — TELEPHONE ENCOUNTER
Patient contacted regarding ultrasound findings. Liver lesion suspected to be benign hemangioma, however not completely characterized. lfts normal 2/2022. He recalls having mri in [employer] [employer] and recalls being told was hemangioma. He will try to locate results. If he cannot find results and/or lesion has increased in size, would advise mri for further characterization. He will contact office with outcome of records retrieval.  Order placed for mri abd in interim.

## 2022-07-19 ENCOUNTER — PATIENT MESSAGE (OUTPATIENT)
Dept: GASTROENTEROLOGY | Facility: CLINIC | Age: 42
End: 2022-07-19

## 2022-07-19 NOTE — TELEPHONE ENCOUNTER
From: Isrrael Jacobsen  Sent: 7/19/2022 10:30 AM CDT  To: Em Gi Clinical Staff  Subject: Hemangioma info    The second message was an ultrasound before the CT scan. It might have measured smaller back then?

## 2022-07-25 ENCOUNTER — TELEPHONE (OUTPATIENT)
Dept: CASE MANAGEMENT | Age: 42
End: 2022-07-25

## 2022-07-25 NOTE — TELEPHONE ENCOUNTER
Pt contacted by apn-  No answer. Left detailed message that when compared to previous ultrasound lesion has increased in size from comparison, and do recommend mri for further characterization.

## 2022-07-25 NOTE — TELEPHONE ENCOUNTER
Rebeca Green,    I called Antonella Olson to advise him the MRI you ordered for him was approved by his insurance, and he stated he did not know you were ordering an MRI for him. He asked if you can please call him.     Thank you  Nica Sethi

## 2022-08-25 ENCOUNTER — TELEPHONE (OUTPATIENT)
Dept: GASTROENTEROLOGY | Facility: CLINIC | Age: 42
End: 2022-08-25

## 2022-08-25 ENCOUNTER — HOSPITAL ENCOUNTER (OUTPATIENT)
Dept: MRI IMAGING | Facility: HOSPITAL | Age: 42
Discharge: HOME | End: 2022-08-25
Attending: NURSE PRACTITIONER
Payer: COMMERCIAL

## 2022-08-25 ENCOUNTER — PATIENT MESSAGE (OUTPATIENT)
Dept: GASTROENTEROLOGY | Facility: CLINIC | Age: 42
End: 2022-08-25

## 2022-08-25 DIAGNOSIS — D18.03 HEMANGIOMA OF LIVER: Primary | ICD-10-CM

## 2022-08-25 DIAGNOSIS — K76.9 LIVER LESION: ICD-10-CM

## 2022-08-25 PROCEDURE — 74183 MRI ABD W/O CNTR FLWD CNTR: CPT | Performed by: NURSE PRACTITIONER

## 2022-08-25 PROCEDURE — A9575 INJ GADOTERATE MEGLUMI 0.1ML: HCPCS | Performed by: NURSE PRACTITIONER

## 2022-08-25 NOTE — TELEPHONE ENCOUNTER
Cora:    Please see below message. Patient called as well requesting to discuss recent test results - please see TE dated 8/25/2022.     Thank you

## 2022-08-25 NOTE — TELEPHONE ENCOUNTER
Patient contacted with results. Plan for ultrasound for surveillance of new lesions in 4 mos to ensure stability. He verbalizes understanding and is in agreement with plan.

## 2022-08-25 NOTE — TELEPHONE ENCOUNTER
Ultrasound liver recall placed into Pt Outreach next due in 4 months per Saira Browne (order placed).

## 2022-12-19 ENCOUNTER — TELEPHONE (OUTPATIENT)
Facility: CLINIC | Age: 42
End: 2022-12-19

## 2022-12-19 NOTE — TELEPHONE ENCOUNTER
Patient contacted and advised he is due for an ultrasound of the liver recall. Central scheduling number given. Patient voiced understanding.

## 2022-12-19 NOTE — TELEPHONE ENCOUNTER
Patient outreach message received:    Ultrasound liver recall placed into Pt Outreach next due in 4 months per Ruth Pae (order placed).

## 2022-12-19 NOTE — TELEPHONE ENCOUNTER
Patient is calling to have ultrasound of liver order be sent to [facility] fax number [phone]

## 2023-03-16 ENCOUNTER — TELEPHONE (OUTPATIENT)
Facility: CLINIC | Age: 43
End: 2023-03-16

## 2023-03-16 NOTE — TELEPHONE ENCOUNTER
1st reminder letter sent to patient            [address] (CPT=[id number]) ([id number]) [[id number]] (Order [id number])

## 2023-07-19 NOTE — TELEPHONE ENCOUNTER
Ultrasound order faxed to [place] [address] at [phone] with return confirmation. Cyclosporine Pregnancy And Lactation Text: This medication is Pregnancy Category C and it isn't know if it is safe during pregnancy. This medication is excreted in breast milk.

## 2023-10-02 ENCOUNTER — OFFICE VISIT (OUTPATIENT)
Dept: INTERNAL MEDICINE CLINIC | Facility: CLINIC | Age: 43
End: 2023-10-02

## 2023-10-02 VITALS
DIASTOLIC BLOOD PRESSURE: 80 MMHG | HEIGHT: 71 IN | OXYGEN SATURATION: 100 % | TEMPERATURE: 98 F | RESPIRATION RATE: 16 BRPM | SYSTOLIC BLOOD PRESSURE: 145 MMHG | BODY MASS INDEX: 25.62 KG/M2 | HEART RATE: 87 BPM | WEIGHT: 183 LBS

## 2023-10-02 DIAGNOSIS — Z00.00 ANNUAL PHYSICAL EXAM: Primary | ICD-10-CM

## 2023-10-02 DIAGNOSIS — G89.29 CHRONIC BILATERAL LOW BACK PAIN WITH RIGHT-SIDED SCIATICA: ICD-10-CM

## 2023-10-02 DIAGNOSIS — M54.41 CHRONIC BILATERAL LOW BACK PAIN WITH RIGHT-SIDED SCIATICA: ICD-10-CM

## 2023-10-02 DIAGNOSIS — R03.0 ELEVATED BP WITHOUT DIAGNOSIS OF HYPERTENSION: ICD-10-CM

## 2023-10-02 PROCEDURE — 90471 IMMUNIZATION ADMIN: CPT | Performed by: INTERNAL MEDICINE

## 2023-10-02 PROCEDURE — 3077F SYST BP >= 140 MM HG: CPT | Performed by: INTERNAL MEDICINE

## 2023-10-02 PROCEDURE — 99396 PREV VISIT EST AGE 40-64: CPT | Performed by: INTERNAL MEDICINE

## 2023-10-02 PROCEDURE — 90715 TDAP VACCINE 7 YRS/> IM: CPT | Performed by: INTERNAL MEDICINE

## 2023-10-02 PROCEDURE — 3008F BODY MASS INDEX DOCD: CPT | Performed by: INTERNAL MEDICINE

## 2023-10-02 PROCEDURE — 3079F DIAST BP 80-89 MM HG: CPT | Performed by: INTERNAL MEDICINE

## 2023-10-02 NOTE — PROGRESS NOTES
Subjective:     Patient ID: Isrrael Jacobsen is a 36year old adult. HPI  Patient comes in for annual physical overall doing okay except for chronic low back pain he sees a chiropractor and does physical therapy. The pain is there almost daily occasionally he takes some medication , denies chest pain of sob     History/Other:   Review of Systems   Constitutional: Negative. HENT: Negative. Eyes: Negative. Respiratory: Negative. Cardiovascular: Negative. Gastrointestinal: Negative. Genitourinary: Negative. Musculoskeletal: Negative. Skin: Negative. Neurological: Negative. Psychiatric/Behavioral: Negative. Current Outpatient Medications   Medication Sig Dispense Refill    albuterol 108 (90 Base) MCG/ACT Inhalation Aero Soln Inhale 2 puffs into the lungs every 6 (six) hours as needed for Wheezing. 1 each 0     Allergies:  Ceclor                  ANAPHYLAXIS    No past medical history on file. Past Surgical History:   Procedure Laterality Date    HERNIA REPAIR        Family History   Problem Relation Age of Onset    High Blood Pressure Father       Social History:   Social History     Socioeconomic History    Marital status:    Tobacco Use    Smoking status: Never    Smokeless tobacco: Never   Vaping Use    Vaping Use: Never used   Substance and Sexual Activity    Alcohol use: Yes     Comment: SOCIALLY    Drug use: Never        Objective:   Physical Exam  Vitals and nursing note reviewed. Constitutional:       Appearance: Isrrael Jacobsen is well-developed. HENT:      Head: Normocephalic and atraumatic. Right Ear: External ear normal.      Left Ear: External ear normal.      Nose: Nose normal.   Eyes:      Conjunctiva/sclera: Conjunctivae normal.      Pupils: Pupils are equal, round, and reactive to light. Cardiovascular:      Rate and Rhythm: Normal rate and regular rhythm. Heart sounds: Normal heart sounds.    Pulmonary:      Effort: Pulmonary effort is normal.      Breath sounds: Normal breath sounds. Abdominal:      General: Bowel sounds are normal.      Palpations: Abdomen is soft. Genitourinary:     Penis: Normal.       Prostate: Normal.      Rectum: Normal.   Musculoskeletal:         General: Tenderness present. Normal range of motion. Cervical back: Normal range of motion and neck supple. Comments: Low back pain, bl   Skin:     General: Skin is warm and dry. Neurological:      Mental Status: Mere Vasquez is alert and oriented to person, place, and time. Deep Tendon Reflexes: Reflexes are normal and symmetric.          Assessment & Plan:   Annual physical exam  (primary encounter diagnosis)-exam as above we will order labs  Chronic bilateral low back pain with right-sided sciatica-refer to physiatry  Elevated BP without diagnosis of hypertension-watch salt intake monitor blood pressure    Orders Placed This Encounter      CBC With Differential With Platelet      Comp Metabolic Panel (14)      Lipid Panel      TSH W Reflex To Free T4      Urinalysis, Routine      TETANUS, DIPHTHERIA TOXOIDS AND ACELLULAR PERTUSIS VACCINE (TDAP), >7 YEARS, IM USE      Meds This Visit:  Requested Prescriptions      No prescriptions requested or ordered in this encounter       Imaging & Referrals:  TETANUS, DIPHTHERIA TOXOIDS AND ACELLULAR PERTUSIS VACCINE (TDAP), >7 YEARS, IM USE  PHYSIATRY - INTERNAL  EKG 12-LEAD

## 2023-10-05 ENCOUNTER — LAB ENCOUNTER (OUTPATIENT)
Dept: LAB | Facility: HOSPITAL | Age: 43
End: 2023-10-05
Attending: INTERNAL MEDICINE
Payer: COMMERCIAL

## 2023-10-05 DIAGNOSIS — R03.0 ELEVATED BP WITHOUT DIAGNOSIS OF HYPERTENSION: ICD-10-CM

## 2023-10-05 DIAGNOSIS — M54.41 CHRONIC BILATERAL LOW BACK PAIN WITH RIGHT-SIDED SCIATICA: ICD-10-CM

## 2023-10-05 DIAGNOSIS — G89.29 CHRONIC BILATERAL LOW BACK PAIN WITH RIGHT-SIDED SCIATICA: ICD-10-CM

## 2023-10-05 DIAGNOSIS — Z00.00 ANNUAL PHYSICAL EXAM: ICD-10-CM

## 2023-10-05 LAB
ALBUMIN SERPL-MCNC: 3.7 G/DL (ref 3.4–5)
ALBUMIN/GLOB SERPL: 1.1 {RATIO} (ref 1–2)
ALP LIVER SERPL-CCNC: 54 U/L
ALT SERPL-CCNC: 18 U/L
ANION GAP SERPL CALC-SCNC: 7 MMOL/L (ref 0–18)
AST SERPL-CCNC: 20 U/L (ref 15–37)
BASOPHILS # BLD AUTO: 0.04 X10(3) UL (ref 0–0.2)
BASOPHILS NFR BLD AUTO: 0.6 %
BILIRUB SERPL-MCNC: 0.8 MG/DL (ref 0.1–2)
BILIRUB UR QL: NEGATIVE
BUN BLD-MCNC: 11 MG/DL (ref 7–18)
BUN/CREAT SERPL: 11.6 (ref 10–20)
CALCIUM BLD-MCNC: 8.9 MG/DL (ref 8.5–10.1)
CHLORIDE SERPL-SCNC: 111 MMOL/L (ref 98–112)
CHOLEST SERPL-MCNC: 164 MG/DL (ref ?–200)
CLARITY UR: CLEAR
CO2 SERPL-SCNC: 25 MMOL/L (ref 21–32)
COLOR UR: YELLOW
CREAT BLD-MCNC: 0.95 MG/DL
DEPRECATED RDW RBC AUTO: 42.6 FL (ref 35.1–46.3)
EGFRCR SERPLBLD CKD-EPI 2021: 102 ML/MIN/1.73M2 (ref 60–?)
EOSINOPHIL # BLD AUTO: 0.39 X10(3) UL (ref 0–0.7)
EOSINOPHIL NFR BLD AUTO: 6 %
ERYTHROCYTE [DISTWIDTH] IN BLOOD BY AUTOMATED COUNT: 12.2 % (ref 11–15)
FASTING PATIENT LIPID ANSWER: YES
FASTING STATUS PATIENT QL REPORTED: YES
GLOBULIN PLAS-MCNC: 3.3 G/DL (ref 2.8–4.4)
GLUCOSE BLD-MCNC: 89 MG/DL (ref 70–99)
GLUCOSE UR-MCNC: NORMAL MG/DL
HCT VFR BLD AUTO: 44.9 %
HDLC SERPL-MCNC: 51 MG/DL (ref 40–59)
HGB BLD-MCNC: 14.7 G/DL
HGB UR QL STRIP.AUTO: NEGATIVE
IMM GRANULOCYTES # BLD AUTO: 0.01 X10(3) UL (ref 0–1)
IMM GRANULOCYTES NFR BLD: 0.2 %
KETONES UR-MCNC: NEGATIVE MG/DL
LDLC SERPL CALC-MCNC: 99 MG/DL (ref ?–100)
LEUKOCYTE ESTERASE UR QL STRIP.AUTO: NEGATIVE
LYMPHOCYTES # BLD AUTO: 2.79 X10(3) UL (ref 1–4)
LYMPHOCYTES NFR BLD AUTO: 43.2 %
MCH RBC QN AUTO: 31 PG (ref 26–34)
MCHC RBC AUTO-ENTMCNC: 32.7 G/DL (ref 31–37)
MCV RBC AUTO: 94.7 FL
MONOCYTES # BLD AUTO: 0.72 X10(3) UL (ref 0.1–1)
MONOCYTES NFR BLD AUTO: 11.1 %
NEUTROPHILS # BLD AUTO: 2.51 X10 (3) UL (ref 1.5–7.7)
NEUTROPHILS # BLD AUTO: 2.51 X10(3) UL (ref 1.5–7.7)
NEUTROPHILS NFR BLD AUTO: 38.9 %
NITRITE UR QL STRIP.AUTO: NEGATIVE
NONHDLC SERPL-MCNC: 113 MG/DL (ref ?–130)
OSMOLALITY SERPL CALC.SUM OF ELEC: 295 MOSM/KG (ref 275–295)
PH UR: 6 [PH] (ref 5–8)
PLATELET # BLD AUTO: 208 10(3)UL (ref 150–450)
POTASSIUM SERPL-SCNC: 3.9 MMOL/L (ref 3.5–5.1)
PROT SERPL-MCNC: 7 G/DL (ref 6.4–8.2)
RBC # BLD AUTO: 4.74 X10(6)UL
SODIUM SERPL-SCNC: 143 MMOL/L (ref 136–145)
SP GR UR STRIP: 1.02 (ref 1–1.03)
TRIGL SERPL-MCNC: 73 MG/DL (ref 30–149)
TSI SER-ACNC: 2.5 MIU/ML (ref 0.36–3.74)
UROBILINOGEN UR STRIP-ACNC: NORMAL
VLDLC SERPL CALC-MCNC: 12 MG/DL (ref 0–30)
WBC # BLD AUTO: 6.5 X10(3) UL (ref 4–11)

## 2023-10-05 PROCEDURE — 80061 LIPID PANEL: CPT

## 2023-10-05 PROCEDURE — 84443 ASSAY THYROID STIM HORMONE: CPT

## 2023-10-05 PROCEDURE — 93010 ELECTROCARDIOGRAM REPORT: CPT | Performed by: INTERNAL MEDICINE

## 2023-10-05 PROCEDURE — 85025 COMPLETE CBC W/AUTO DIFF WBC: CPT

## 2023-10-05 PROCEDURE — 93005 ELECTROCARDIOGRAM TRACING: CPT

## 2023-10-05 PROCEDURE — 80053 COMPREHEN METABOLIC PANEL: CPT

## 2023-10-05 PROCEDURE — 81003 URINALYSIS AUTO W/O SCOPE: CPT

## 2023-10-05 PROCEDURE — 36415 COLL VENOUS BLD VENIPUNCTURE: CPT

## 2023-10-06 LAB
ATRIAL RATE: 60 BPM
P AXIS: -15 DEGREES
P-R INTERVAL: 174 MS
Q-T INTERVAL: 416 MS
QRS DURATION: 76 MS
QTC CALCULATION (BEZET): 416 MS
R AXIS: 43 DEGREES
T AXIS: 35 DEGREES
VENTRICULAR RATE: 60 BPM

## 2023-11-15 ENCOUNTER — OFFICE VISIT (OUTPATIENT)
Dept: INTERNAL MEDICINE CLINIC | Facility: CLINIC | Age: 43
End: 2023-11-15

## 2023-11-15 ENCOUNTER — HOSPITAL ENCOUNTER (OUTPATIENT)
Dept: GENERAL RADIOLOGY | Facility: HOSPITAL | Age: 43
Discharge: HOME | End: 2023-11-15
Attending: PHYSICAL MEDICINE & REHABILITATION
Payer: COMMERCIAL

## 2023-11-15 ENCOUNTER — OFFICE VISIT (OUTPATIENT)
Dept: PHYSICAL MEDICINE AND REHAB | Facility: CLINIC | Age: 43
End: 2023-11-15
Payer: COMMERCIAL

## 2023-11-15 ENCOUNTER — HOSPITAL ENCOUNTER (OUTPATIENT)
Dept: GENERAL RADIOLOGY | Age: 43
Discharge: HOME | End: 2023-11-15
Attending: INTERNAL MEDICINE
Payer: COMMERCIAL

## 2023-11-15 VITALS
BODY MASS INDEX: 25.48 KG/M2 | HEIGHT: 71 IN | TEMPERATURE: 98 F | OXYGEN SATURATION: 100 % | HEART RATE: 90 BPM | WEIGHT: 182 LBS | SYSTOLIC BLOOD PRESSURE: 153 MMHG | DIASTOLIC BLOOD PRESSURE: 76 MMHG

## 2023-11-15 VITALS — HEART RATE: 66 BPM | WEIGHT: 182 LBS | HEIGHT: 71 IN | OXYGEN SATURATION: 99 % | BODY MASS INDEX: 25.48 KG/M2

## 2023-11-15 DIAGNOSIS — M79.10 MYALGIA: Primary | ICD-10-CM

## 2023-11-15 DIAGNOSIS — R05.1 ACUTE COUGH: ICD-10-CM

## 2023-11-15 DIAGNOSIS — M51.37 DDD (DEGENERATIVE DISC DISEASE), LUMBOSACRAL: ICD-10-CM

## 2023-11-15 DIAGNOSIS — M54.16 LUMBAR RADICULOPATHY: ICD-10-CM

## 2023-11-15 DIAGNOSIS — M51.36 BULGE OF LUMBAR DISC WITHOUT MYELOPATHY: ICD-10-CM

## 2023-11-15 DIAGNOSIS — J45.901 MODERATE ASTHMA WITH EXACERBATION, UNSPECIFIED WHETHER PERSISTENT: ICD-10-CM

## 2023-11-15 DIAGNOSIS — M54.59 MECHANICAL LOW BACK PAIN: ICD-10-CM

## 2023-11-15 DIAGNOSIS — R03.0 ELEVATED BP WITHOUT DIAGNOSIS OF HYPERTENSION: ICD-10-CM

## 2023-11-15 DIAGNOSIS — M48.061 LUMBAR FORAMINAL STENOSIS: ICD-10-CM

## 2023-11-15 DIAGNOSIS — M79.10 MYALGIA: ICD-10-CM

## 2023-11-15 DIAGNOSIS — S76.019A STRAIN OF GLUTEUS MEDIUS, UNSPECIFIED LATERALITY, INITIAL ENCOUNTER: ICD-10-CM

## 2023-11-15 DIAGNOSIS — R09.89 CHEST RALES: ICD-10-CM

## 2023-11-15 DIAGNOSIS — R06.2 WHEEZING: ICD-10-CM

## 2023-11-15 DIAGNOSIS — M47.816 LUMBAR SPONDYLOSIS: ICD-10-CM

## 2023-11-15 DIAGNOSIS — R09.89 CHEST RALES: Primary | ICD-10-CM

## 2023-11-15 PROCEDURE — 3078F DIAST BP <80 MM HG: CPT | Performed by: INTERNAL MEDICINE

## 2023-11-15 PROCEDURE — 3077F SYST BP >= 140 MM HG: CPT | Performed by: INTERNAL MEDICINE

## 2023-11-15 PROCEDURE — 3008F BODY MASS INDEX DOCD: CPT | Performed by: INTERNAL MEDICINE

## 2023-11-15 PROCEDURE — 99244 OFF/OP CNSLTJ NEW/EST MOD 40: CPT | Performed by: PHYSICAL MEDICINE & REHABILITATION

## 2023-11-15 PROCEDURE — 71046 X-RAY EXAM CHEST 2 VIEWS: CPT | Performed by: INTERNAL MEDICINE

## 2023-11-15 PROCEDURE — 99214 OFFICE O/P EST MOD 30 MIN: CPT | Performed by: INTERNAL MEDICINE

## 2023-11-15 PROCEDURE — 3008F BODY MASS INDEX DOCD: CPT | Performed by: PHYSICAL MEDICINE & REHABILITATION

## 2023-11-15 PROCEDURE — 72110 X-RAY EXAM L-2 SPINE 4/>VWS: CPT | Performed by: PHYSICAL MEDICINE & REHABILITATION

## 2023-11-15 RX ORDER — BENZONATATE 100 MG/1
100 CAPSULE ORAL 3 TIMES DAILY PRN
Qty: 20 CAPSULE | Refills: 0 | Status: SHIPPED | OUTPATIENT
Start: 2023-11-15 | End: 2023-11-22

## 2023-11-15 RX ORDER — AZITHROMYCIN 250 MG/1
TABLET, FILM COATED ORAL
Qty: 6 TABLET | Refills: 0 | Status: SHIPPED | OUTPATIENT
Start: 2023-11-15 | End: 2023-11-19

## 2023-11-15 RX ORDER — NAPROXEN 500 MG/1
500 TABLET ORAL 2 TIMES DAILY WITH MEALS
Qty: 60 TABLET | Refills: 0 | Status: SHIPPED | OUTPATIENT
Start: 2023-11-15

## 2023-11-15 RX ORDER — PREDNISONE 5 MG/1
5 TABLET ORAL 2 TIMES DAILY
Qty: 6 TABLET | Refills: 0 | Status: SHIPPED | OUTPATIENT
Start: 2023-11-15 | End: 2023-11-15

## 2023-11-15 NOTE — PROGRESS NOTES
Subjective:   Patient ID: Brandie Poon is a 39year old adult. HPI    Patient comes in today with complaint of cough some wheezing has been going on for almost 10 days now feels overall better but still hear himself wheezing he does have asthma but has been well controlled. No chest pain or shortness of breath. Patient also lately has been noticed to have a bit of elevated blood pressure he says at home it fluctuates between 709 and 175 systolic     History/Other:   Review of Systems   Constitutional: Negative. Negative for fatigue and fever. HENT: Negative. Negative for congestion. Eyes: Negative. Respiratory:  Positive for cough and wheezing. Negative for shortness of breath. Cardiovascular: Negative. Negative for chest pain, palpitations and leg swelling. Gastrointestinal: Negative. Endocrine: Negative for cold intolerance and heat intolerance. Genitourinary: Negative. Negative for dysuria, flank pain and hematuria. Musculoskeletal: Negative. Negative for arthralgias, back pain and myalgias. Skin: Negative. Neurological: Negative. Negative for dizziness, tremors, syncope, weakness and headaches. Psychiatric/Behavioral: Negative. Negative for agitation, behavioral problems and suicidal ideas. The patient is not nervous/anxious. Current Outpatient Medications   Medication Sig Dispense Refill    azithromycin (ZITHROMAX Z-BRII) 250 MG Oral Tab Take 2 tablets (500 mg total) by mouth daily for 1 day, THEN 1 tablet (250 mg total) daily for 4 days. 6 tablet 0    benzonatate 100 MG Oral Cap Take 1 capsule (100 mg total) by mouth 3 (three) times daily as needed for cough. 20 capsule 0    predniSONE 5 MG Oral Tab Take 1 tablet (5 mg total) by mouth 2 (two) times daily for 3 days. 6 tablet 0    albuterol 108 (90 Base) MCG/ACT Inhalation Aero Soln Inhale 2 puffs into the lungs every 6 (six) hours as needed for Wheezing. 1 each 0     Allergies:   Allergies   Allergen Reactions Kayla ANAPHYLAXIS       Objective:   Physical Exam  Vitals and nursing note reviewed. Constitutional:       Appearance: Amador Sheffield is well-developed. HENT:      Head: Normocephalic and atraumatic. Right Ear: External ear normal.      Left Ear: External ear normal.      Nose: Nose normal.   Eyes:      Conjunctiva/sclera: Conjunctivae normal.      Pupils: Pupils are equal, round, and reactive to light. Cardiovascular:      Rate and Rhythm: Normal rate and regular rhythm. Heart sounds: Normal heart sounds. Pulmonary:      Effort: Pulmonary effort is normal.      Breath sounds: Wheezing and rales present. Comments: Rt lower lobe mostly   Abdominal:      General: Bowel sounds are normal.      Palpations: Abdomen is soft. Genitourinary:     Vagina: Normal.   Musculoskeletal:         General: Normal range of motion. Cervical back: Normal range of motion and neck supple. Skin:     General: Skin is warm and dry. Neurological:      Mental Status: Amador Sheffield is alert and oriented to person, place, and time. Deep Tendon Reflexes: Reflexes are normal and symmetric. Psychiatric:         Behavior: Behavior normal.         Thought Content: Thought content normal.         Judgment: Judgment normal.         Assessment & Plan:   1. Chest rales -? Pneumonia, will get cxr will cover with ab   2. Acute cough - as above will add cough med   3. Wheezing - as above use the inhaler will add low dose steroids for few days    4. Elevated BP without diagnosis of hypertension - - monitor bp at home, follow in 1 month   5. Moderate asthma with exacerbation, unspecified whether persistent -as above we will follow       No orders of the defined types were placed in this encounter.       Meds This Visit:  Requested Prescriptions     Signed Prescriptions Disp Refills    azithromycin (ZITHROMAX Z-BRII) 250 MG Oral Tab 6 tablet 0     Sig: Take 2 tablets (500 mg total) by mouth daily for 1 day, THEN 1 tablet (250 mg total) daily for 4 days. benzonatate 100 MG Oral Cap 20 capsule 0     Sig: Take 1 capsule (100 mg total) by mouth 3 (three) times daily as needed for cough. predniSONE 5 MG Oral Tab 6 tablet 0     Sig: Take 1 tablet (5 mg total) by mouth 2 (two) times daily for 3 days.        Imaging & Referrals:  None

## 2023-11-15 NOTE — PATIENT INSTRUCTIONS
1) Please get X-rays of the Lumbar spine today on your way out. 2) Please call and schedule your MRI at [phone]. Once you have your MRI scheduled, then call my office again to schedule a follow-up visit soon after your MRI so we may review the images together. 3) Take Naprosyn 500 mg 1 tablet twice per day with food for the next two weeks and then as needed but no more than 2 tablets per day. Do not take with any other NSAIDS (Ibuprofen, Advil, Aleve, etc). OK to take Tylenol 500 mg every 6 hours as needed for pain. If you develop any side effects including stomach aches, nausea, vomiting, or other gastrointestinal symptoms, stop the medication and call my office. 4) Tylenol 500-1000 mg every 6-8 hours as needed for pain. No more than 3000 mg daily. 5) Follow up with me to review the MRI images and discuss the next steps. This can be done virtually.

## 2023-12-10 ENCOUNTER — APPOINTMENT (OUTPATIENT)
Dept: GENERAL RADIOLOGY | Facility: HOSPITAL | Age: 43
End: 2023-12-10
Attending: NURSE PRACTITIONER
Payer: COMMERCIAL

## 2023-12-10 ENCOUNTER — HOSPITAL ENCOUNTER (EMERGENCY)
Facility: HOSPITAL | Age: 43
Discharge: HOME | End: 2023-12-11
Payer: COMMERCIAL

## 2023-12-10 VITALS
DIASTOLIC BLOOD PRESSURE: 78 MMHG | OXYGEN SATURATION: 97 % | RESPIRATION RATE: 10 BRPM | HEART RATE: 52 BPM | TEMPERATURE: 98 F | SYSTOLIC BLOOD PRESSURE: 122 MMHG

## 2023-12-10 DIAGNOSIS — R07.9 ACUTE CHEST PAIN: Primary | ICD-10-CM

## 2023-12-10 LAB
ANION GAP SERPL CALC-SCNC: 5 MMOL/L (ref 0–18)
BASOPHILS # BLD AUTO: 0.06 X10(3) UL (ref 0–0.2)
BASOPHILS NFR BLD AUTO: 0.8 %
BUN BLD-MCNC: 9 MG/DL (ref 9–23)
BUN/CREAT SERPL: 8.8 (ref 10–20)
CALCIUM BLD-MCNC: 9.8 MG/DL (ref 8.7–10.4)
CHLORIDE SERPL-SCNC: 106 MMOL/L (ref 98–112)
CO2 SERPL-SCNC: 30 MMOL/L (ref 21–32)
CREAT BLD-MCNC: 1.02 MG/DL
DEPRECATED RDW RBC AUTO: 41.6 FL (ref 35.1–46.3)
EGFRCR SERPLBLD CKD-EPI 2021: 93 ML/MIN/1.73M2 (ref 60–?)
EOSINOPHIL # BLD AUTO: 0.36 X10(3) UL (ref 0–0.7)
EOSINOPHIL NFR BLD AUTO: 5.1 %
ERYTHROCYTE [DISTWIDTH] IN BLOOD BY AUTOMATED COUNT: 12.2 % (ref 11–15)
GLUCOSE BLD-MCNC: 93 MG/DL (ref 70–99)
HCT VFR BLD AUTO: 42.1 %
HGB BLD-MCNC: 14 G/DL
IMM GRANULOCYTES # BLD AUTO: 0.01 X10(3) UL (ref 0–1)
IMM GRANULOCYTES NFR BLD: 0.1 %
LYMPHOCYTES # BLD AUTO: 3.03 X10(3) UL (ref 1–4)
LYMPHOCYTES NFR BLD AUTO: 42.9 %
MCH RBC QN AUTO: 30.6 PG (ref 26–34)
MCHC RBC AUTO-ENTMCNC: 33.3 G/DL (ref 31–37)
MCV RBC AUTO: 92.1 FL
MONOCYTES # BLD AUTO: 0.62 X10(3) UL (ref 0.1–1)
MONOCYTES NFR BLD AUTO: 8.8 %
NEUTROPHILS # BLD AUTO: 2.99 X10 (3) UL (ref 1.5–7.7)
NEUTROPHILS # BLD AUTO: 2.99 X10(3) UL (ref 1.5–7.7)
NEUTROPHILS NFR BLD AUTO: 42.3 %
OSMOLALITY SERPL CALC.SUM OF ELEC: 290 MOSM/KG (ref 275–295)
PLATELET # BLD AUTO: 182 10(3)UL (ref 150–450)
POTASSIUM SERPL-SCNC: 3.6 MMOL/L (ref 3.5–5.1)
RBC # BLD AUTO: 4.57 X10(6)UL
SODIUM SERPL-SCNC: 141 MMOL/L (ref 136–145)
TROPONIN I SERPL HS-MCNC: 3 NG/L
WBC # BLD AUTO: 7.1 X10(3) UL (ref 4–11)

## 2023-12-10 PROCEDURE — 84484 ASSAY OF TROPONIN QUANT: CPT | Performed by: NURSE PRACTITIONER

## 2023-12-10 PROCEDURE — 80048 BASIC METABOLIC PNL TOTAL CA: CPT | Performed by: NURSE PRACTITIONER

## 2023-12-10 PROCEDURE — 85025 COMPLETE CBC W/AUTO DIFF WBC: CPT | Performed by: NURSE PRACTITIONER

## 2023-12-10 PROCEDURE — 36415 COLL VENOUS BLD VENIPUNCTURE: CPT

## 2023-12-10 PROCEDURE — 99284 EMERGENCY DEPT VISIT MOD MDM: CPT

## 2023-12-10 PROCEDURE — 93005 ELECTROCARDIOGRAM TRACING: CPT

## 2023-12-10 PROCEDURE — 71045 X-RAY EXAM CHEST 1 VIEW: CPT | Performed by: NURSE PRACTITIONER

## 2023-12-10 PROCEDURE — 93010 ELECTROCARDIOGRAM REPORT: CPT

## 2023-12-11 LAB
ATRIAL RATE: 61 BPM
P AXIS: 18 DEGREES
P-R INTERVAL: 154 MS
Q-T INTERVAL: 414 MS
QRS DURATION: 84 MS
QTC CALCULATION (BEZET): 416 MS
R AXIS: 52 DEGREES
T AXIS: 47 DEGREES
VENTRICULAR RATE: 61 BPM
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
## 2023-12-12 ENCOUNTER — OFFICE VISIT (OUTPATIENT)
Dept: INTERNAL MEDICINE CLINIC | Facility: CLINIC | Age: 43
End: 2023-12-12

## 2023-12-12 VITALS
OXYGEN SATURATION: 98 % | HEART RATE: 107 BPM | TEMPERATURE: 98 F | SYSTOLIC BLOOD PRESSURE: 116 MMHG | WEIGHT: 182 LBS | BODY MASS INDEX: 25.48 KG/M2 | HEIGHT: 71 IN | DIASTOLIC BLOOD PRESSURE: 74 MMHG | RESPIRATION RATE: 17 BRPM

## 2023-12-12 DIAGNOSIS — R07.89 CHEST DISCOMFORT: ICD-10-CM

## 2023-12-12 DIAGNOSIS — Z09 ENCOUNTER FOR EXAMINATION FOLLOWING TREATMENT AT HOSPITAL: Primary | ICD-10-CM

## 2023-12-12 PROCEDURE — 3074F SYST BP LT 130 MM HG: CPT | Performed by: INTERNAL MEDICINE

## 2023-12-12 PROCEDURE — 99214 OFFICE O/P EST MOD 30 MIN: CPT | Performed by: INTERNAL MEDICINE

## 2023-12-12 PROCEDURE — 3008F BODY MASS INDEX DOCD: CPT | Performed by: INTERNAL MEDICINE

## 2023-12-12 PROCEDURE — 3078F DIAST BP <80 MM HG: CPT | Performed by: INTERNAL MEDICINE

## 2023-12-15 ENCOUNTER — HOSPITAL ENCOUNTER (OUTPATIENT)
Dept: MRI IMAGING | Age: 43
Discharge: HOME | End: 2023-12-15
Attending: PHYSICAL MEDICINE & REHABILITATION
Payer: COMMERCIAL

## 2023-12-15 DIAGNOSIS — M79.10 MYALGIA: ICD-10-CM

## 2023-12-15 DIAGNOSIS — M54.16 LUMBAR RADICULOPATHY: ICD-10-CM

## 2023-12-15 DIAGNOSIS — M48.061 LUMBAR FORAMINAL STENOSIS: ICD-10-CM

## 2023-12-15 DIAGNOSIS — M47.816 LUMBAR SPONDYLOSIS: ICD-10-CM

## 2023-12-15 DIAGNOSIS — M51.36 BULGE OF LUMBAR DISC WITHOUT MYELOPATHY: ICD-10-CM

## 2023-12-15 DIAGNOSIS — M54.59 MECHANICAL LOW BACK PAIN: ICD-10-CM

## 2023-12-15 DIAGNOSIS — M51.37 DDD (DEGENERATIVE DISC DISEASE), LUMBOSACRAL: ICD-10-CM

## 2023-12-15 DIAGNOSIS — S76.019A STRAIN OF GLUTEUS MEDIUS, UNSPECIFIED LATERALITY, INITIAL ENCOUNTER: ICD-10-CM

## 2023-12-15 PROCEDURE — 72148 MRI LUMBAR SPINE W/O DYE: CPT | Performed by: PHYSICAL MEDICINE & REHABILITATION

## 2023-12-15 RX ORDER — NAPROXEN 500 MG/1
500 TABLET ORAL 2 TIMES DAILY PRN
Qty: 60 TABLET | Refills: 0 | Status: SHIPPED | OUTPATIENT
Start: 2023-12-15

## 2023-12-15 NOTE — TELEPHONE ENCOUNTER
Refill Request    Medication request: naproxen (NAPROSYN) 500 MG Oral Tab. Take 1 tablet (500 mg total) by mouth 2 (two) times daily with meals. Take for 2 weeks as directed and then as needed. Anna Deutsch MD   Due back to clinic per last office note: Per Dr. Hema Stratton: \"Follow up with me to review the MRI images and discuss the next steps. This can be done virtually. \"  Katie Pilcarlyn: 12/22/2023 Nelson Bhatti MD      [facility]Last refill: 11/15/2023 #60    Urine drug screen (if applicable): n/a  Pain contract: n/a    LOV plan (if weaning or changing medications): Per Dr. Hema Stratton: \"Take Naprosyn 500 mg 1 tablet twice per day with food for the next two weeks and then as needed but no more than 2 tablets per day. Do not take with any other NSAIDS (Ibuprofen, Advil, Aleve, etc). OK to take Tylenol 500 mg every 6 hours as needed for pain. If you develop any side effects including stomach aches, nausea, vomiting, or other gastrointestinal symptoms, stop the medication and call my office. \"

## 2023-12-22 ENCOUNTER — TELEMEDICINE (OUTPATIENT)
Dept: PHYSICAL MEDICINE AND REHAB | Facility: CLINIC | Age: 43
End: 2023-12-22
Payer: COMMERCIAL

## 2023-12-22 DIAGNOSIS — M51.37 DDD (DEGENERATIVE DISC DISEASE), LUMBOSACRAL: ICD-10-CM

## 2023-12-22 DIAGNOSIS — M51.36 ANNULAR TEAR OF LUMBAR DISC: ICD-10-CM

## 2023-12-22 DIAGNOSIS — M79.10 MYALGIA: Primary | ICD-10-CM

## 2023-12-22 DIAGNOSIS — M47.816 LUMBAR SPONDYLOSIS: ICD-10-CM

## 2023-12-22 DIAGNOSIS — M51.36 BULGE OF LUMBAR DISC WITHOUT MYELOPATHY: ICD-10-CM

## 2023-12-22 DIAGNOSIS — M54.59 MECHANICAL LOW BACK PAIN: ICD-10-CM

## 2023-12-22 DIAGNOSIS — S76.019A STRAIN OF GLUTEUS MEDIUS, UNSPECIFIED LATERALITY, INITIAL ENCOUNTER: ICD-10-CM

## 2023-12-22 DIAGNOSIS — M54.16 LUMBAR RADICULOPATHY: ICD-10-CM

## 2023-12-22 DIAGNOSIS — M48.061 LUMBAR FORAMINAL STENOSIS: ICD-10-CM

## 2023-12-22 PROCEDURE — 99214 OFFICE O/P EST MOD 30 MIN: CPT | Performed by: PHYSICAL MEDICINE & REHABILITATION

## 2023-12-22 NOTE — PATIENT INSTRUCTIONS
1) follow-up with me as needed. If symptoms recur, I would recommend a bilateral S1 transforaminal epidural steroid injection  2) Tylenol 500-1000 mg every 6-8 hours as needed for pain. No more than 3000 mg daily.   3) continue home exercise program

## 2023-12-22 NOTE — PROGRESS NOTES
[address]  Video Visit Progress Note      Telehealth outside of [address] Verbal Consent   I conducted a telehealth visit with Jed Espana today, 12/21/23, which was completed using two-way, real-time interactive audio and video communication. This has been done in good abimbola to provide continuity of care in the best interest of the provider-patient relationship, due to the COVID -19 public health crisis/national emergency where restrictions of face-to-face office visits are ongoing. Every conscious effort was taken to allow for sufficient and adequate time to complete the visit. The patient was made aware of the limitations of the telehealth visit, including treatment limitations as no physical exam could be performed. The patient was advised to call 911 or to go to the ER in case there was an emergency. The patient was also advised of the potential privacy & security concerns related to the telehealth platform. The patient was made aware of where to find [facility] notice of privacy practices, telehealth consent form and other related consent forms and documents. which are located on the [facility] website. The patient verbally agreed to telehealth consent form, related consents and the risks discussed. Lastly, the patient confirmed that [place]. Included in this visit, time may have been spent reviewing labs, medications, radiology tests and decision making. Appropriate medical decision-making and tests are ordered as detailed in the plan of care above. Coding/billing information is submitted for this visit based on complexity of care and/or time spent for the visit. CHIEF COMPLAINT:    Chief Complaint   Patient presents with    Low Back Pain    Imaging       History of Present Illness:   The patient is a 39year old  left handed male with a significant past medical history of a hernia who presents with right-sided low back pain with radiation into the anterior knee and posterior calf that is been ongoing for about the last 15 years but worsening over the last couple years and months he was last seen by me on 11/15/2023 I recommended x-rays and an MRI of the lumbar spine. Reviewed those images independently with Catarina Sethi today. He rates his pain a 3/10. PAST MEDICAL HISTORY:  History reviewed. No pertinent past medical history. SURGICAL HISTORY:  Past Surgical History:   Procedure Laterality Date    FRACTURE SURGERY      HERNIA REPAIR         SOCIAL HISTORY:   Social History     Occupational History    Not on file   Tobacco Use    Smoking status: Never    Smokeless tobacco: Never   Vaping Use    Vaping Use: Never used   Substance and Sexual Activity    Alcohol use: Yes     Comment: SOCIALLY    Drug use: Never    Sexual activity: Not on file       FAMILY HISTORY:   Family History   Problem Relation Age of Onset    High Blood Pressure Father        CURRENT MEDICATIONS:   Current Outpatient Medications   Medication Sig Dispense Refill    naproxen 500 MG Oral Tab Take 1 tablet (500 mg total) by mouth 2 (two) times daily as needed. 60 tablet 0    albuterol 108 (90 Base) MCG/ACT Inhalation Aero Soln Inhale 2 puffs into the lungs every 6 (six) hours as needed for Wheezing. 1 each 0       ALLERGIES:   Allergies   Allergen Reactions    Ceclor ANAPHYLAXIS       REVIEW OF SYSTEMS:   Review of Systems   Constitutional: Negative. HENT: Negative. Eyes: Negative. Respiratory: Negative. Cardiovascular: Negative. Gastrointestinal: Negative. Genitourinary: Negative. Musculoskeletal: As per HPI  Skin: Negative. Neurological: As per HPI  Endo/Heme/Allergies: Negative. Psychiatric/Behavioral: Negative. All other systems reviewed and are negative. Pertinent positives and negatives noted in the HPI. PHYSICAL EXAM:     There is no height or weight on file to calculate BMI.     General: No immediate distress  Head: Normocephalic/ Atraumatic  Eyes: Extra-occular movements intact  Ears/Nose/Throat:  External appearance identifies normal appearance without obvious deformity  Cardiovascular: No cyanosis, clubbing or edema  Respiratory: Non-labored respirations  Skin: No lesions noted   Neurological: alert & oriented x 3, attentive, able to follow commands, comprehention intact, spontaneous speech intact  Psychiatric: Mood and affect appropriate        Data  Admission on 12/10/2023, Discharged on 12/11/2023   Component Date Value Ref Range Status    Ventricular rate 12/10/2023 61  BPM Final    Atrial rate 12/10/2023 61  BPM Final    P-R Interval 12/10/2023 154  ms Final    QRS Duration 12/10/2023 84  ms Final    Q-T Interval 12/10/2023 414  ms Final    QTC Calculation (Bezet) 12/10/2023 416  ms Final    P Axis 12/10/2023 18  degrees Final    R Axis 12/10/2023 52  degrees Final    T Axis 12/10/2023 47  degrees Final    Glucose 12/10/2023 93  70 - 99 mg/dL Final    Sodium 12/10/2023 141  136 - 145 mmol/L Final    Potassium 12/10/2023 3.6  3.5 - 5.1 mmol/L Final    Chloride 12/10/2023 106  98 - 112 mmol/L Final    CO2 12/10/2023 30.0  21.0 - 32.0 mmol/L Final    Anion Gap 12/10/2023 5  0 - 18 mmol/L Final    BUN 12/10/2023 9  9 - 23 mg/dL Final    Creatinine 12/10/2023 1.02  Male 0.70-1.30 : Female 0.55-1.02 mg/dL Final    BUN/CREA Ratio 12/10/2023 8.8 (L)  10.0 - 20.0 Final    Calcium, Total 12/10/2023 9.8  8.7 - 10.4 mg/dL Final    Calculated Osmolality 12/10/2023 290  275 - 295 mOsm/kg Final    eGFR-Cr 12/10/2023 93  >=60 mL/min/1.73m2 Final    Troponin I (High Sensitivity) 12/10/2023 3  Female <=34 : Male <=53 ng/L Final    WBC 12/10/2023 7.1  4.0 - 11.0 x10(3) uL Final    RBC 12/10/2023 4.57  Male 4.30-5.70 :  Female 3.80-5.30 x10(6)uL Final    HGB 12/10/2023 14.0  Male 13.0-17.5 : Female 12.0-16.0 g/dL Final    HCT 12/10/2023 42.1  Male 39.0-53.0 : Female 35.0-48.0 % Final    MCV 12/10/2023 92.1  Male 80.0-100.0 : Female 80.0-100.0 fL Final    MCH 12/10/2023 30.6  26.0 - 34.0 pg Final    MCHC 12/10/2023 33.3  31.0 - 37.0 g/dL Final    RDW-SD 12/10/2023 41.6  35.1 - 46.3 fL Final    RDW 12/10/2023 12.2  11.0 - 15.0 % Final    PLT 12/10/2023 182.0  150.0 - 450.0 10(3)uL Final    Neutrophil Absolute Prelim 12/10/2023 2.99  1.50 - 7.70 x10 (3) uL Final    Neutrophil Absolute 12/10/2023 2.99  1.50 - 7.70 x10(3) uL Final    Lymphocyte Absolute 12/10/2023 3.03  1.00 - 4.00 x10(3) uL Final    Monocyte Absolute 12/10/2023 0.62  0.10 - 1.00 x10(3) uL Final    Eosinophil Absolute 12/10/2023 0.36  0.00 - 0.70 x10(3) uL Final    Basophil Absolute 12/10/2023 0.06  0.00 - 0.20 x10(3) uL Final    Immature Granulocyte Absolute 12/10/2023 0.01  0.00 - 1.00 x10(3) uL Final    Neutrophil % 12/10/2023 42.3  % Final    Lymphocyte % 12/10/2023 42.9  % Final    Monocyte % 12/10/2023 8.8  % Final    Eosinophil % 12/10/2023 5.1  % Final    Basophil % 12/10/2023 0.8  % Final    Immature Granulocyte % 12/10/2023 0.1  % Final   [facility] Lab Encounter on 10/05/2023   Component Date Value Ref Range Status    Ventricular rate 10/05/2023 60  BPM Final    Atrial rate 10/05/2023 60  BPM Final    P-R Interval 10/05/2023 174  ms Final    QRS Duration 10/05/2023 76  ms Final    Q-T Interval 10/05/2023 416  ms Final    QTC Calculation (Bezet) 10/05/2023 416  ms Final    P Axis 10/05/2023 -15  degrees Final    R Axis 10/05/2023 43  degrees Final    T Axis 10/05/2023 35  degrees Final    Glucose 10/05/2023 89  70 - 99 mg/dL Final    Sodium 10/05/2023 143  136 - 145 mmol/L Final    Potassium 10/05/2023 3.9  3.5 - 5.1 mmol/L Final    Chloride 10/05/2023 111  98 - 112 mmol/L Final    CO2 10/05/2023 25.0  21.0 - 32.0 mmol/L Final    Anion Gap 10/05/2023 7  0 - 18 mmol/L Final    BUN 10/05/2023 11  7 - 18 mg/dL Final    Creatinine 10/05/2023 0.95  Male 0.70-1.30 :  Female 0.55-1.02 mg/dL Final    BUN/CREA Ratio 10/05/2023 11.6  10.0 - 20.0 Final    Calcium, Total 10/05/2023 8.9  8.5 - 10.1 mg/dL Final    Calculated Osmolality 10/05/2023 295  275 - 295 mOsm/kg Final    eGFR-Cr 10/05/2023 102  >=60 mL/min/1.73m2 Final    ALT 10/05/2023 18  Male 16-61 : Female 13-56 U/L Final    AST 10/05/2023 20  15 - 37 U/L Final    Alkaline Phosphatase 10/05/2023 54  Male  : Female 37-98 U/L Final    Bilirubin, Total 10/05/2023 0.8  0.1 - 2.0 mg/dL Final    Total Protein 10/05/2023 7.0  6.4 - 8.2 g/dL Final    Albumin 10/05/2023 3.7  3.4 - 5.0 g/dL Final    Globulin  10/05/2023 3.3  2.8 - 4.4 g/dL Final    A/G Ratio 10/05/2023 1.1  1.0 - 2.0 Final    Patient Fasting for CMP? 10/05/2023 Yes   Final    Cholesterol, Total 10/05/2023 164  <200 mg/dL Final    HDL Cholesterol 10/05/2023 51  40 - 59 mg/dL Final    Triglycerides 10/05/2023 73  30 - 149 mg/dL Final    LDL Cholesterol 10/05/2023 99  <100 mg/dL Final    VLDL 10/05/2023 12  0 - 30 mg/dL Final    Non HDL Chol 10/05/2023 113  <130 mg/dL Final    Patient Fasting for Lipid? 10/05/2023 Yes   Final    TSH 10/05/2023 2.500  0.358 - 3.740 mIU/mL Final    Urine Color 10/05/2023 Yellow  Yellow Final    Clarity Urine 10/05/2023 Clear  Clear Final    Spec Gravity 10/05/2023 1.024  1.005 - 1.030 Final    Glucose Urine 10/05/2023 Normal  Normal mg/dL Final    Bilirubin Urine 10/05/2023 Negative  Negative Final    Ketones Urine 10/05/2023 Negative  Negative mg/dL Final    Blood Urine 10/05/2023 Negative  Negative Final    pH Urine 10/05/2023 6.0  5.0 - 8.0 Final    Protein Urine 10/05/2023 Trace (A)  Negative mg/dL Final    Urobilinogen Urine 10/05/2023 Normal  Normal Final    Nitrite Urine 10/05/2023 Negative  Negative Final    Leukocyte Esterase Urine 10/05/2023 Negative  Negative Final    Microscopic 10/05/2023 Microscopic not indicated   Final    WBC 10/05/2023 6.5  4.0 - 11.0 x10(3) uL Final    RBC 10/05/2023 4.74  Male 4.30-5.70 :  Female 3.80-5.30 x10(6)uL Final    HGB 10/05/2023 14.7  Male 13.0-17.5 : Female 12.0-16.0 g/dL Final    HCT 10/05/2023 44.9  Male 39. 0-53.0 : Female 35.0-48.0 % Final    MCV 10/05/2023 94.7  Male 80.0-100.0 : Female 80.0-100.0 fL Final    MCH 10/05/2023 31.0  26.0 - 34.0 pg Final    MCHC 10/05/2023 32.7  31.0 - 37.0 g/dL Final    RDW-SD 10/05/2023 42.6  35.1 - 46.3 fL Final    RDW 10/05/2023 12.2  11.0 - 15.0 % Final    PLT 10/05/2023 208.0  150.0 - 450.0 10(3)uL Final    Neutrophil Absolute Prelim 10/05/2023 2.51  1.50 - 7.70 x10 (3) uL Final    Neutrophil Absolute 10/05/2023 2.51  1.50 - 7.70 x10(3) uL Final    Lymphocyte Absolute 10/05/2023 2.79  1.00 - 4.00 x10(3) uL Final    Monocyte Absolute 10/05/2023 0.72  0.10 - 1.00 x10(3) uL Final    Eosinophil Absolute 10/05/2023 0.39  0.00 - 0.70 x10(3) uL Final    Basophil Absolute 10/05/2023 0.04  0.00 - 0.20 x10(3) uL Final    Immature Granulocyte Absolute 10/05/2023 0.01  0.00 - 1.00 x10(3) uL Final    Neutrophil % 10/05/2023 38.9  % Final    Lymphocyte % 10/05/2023 43.2  % Final    Monocyte % 10/05/2023 11.1  % Final    Eosinophil % 10/05/2023 6.0  % Final    Basophil % 10/05/2023 0.6  % Final    Immature Granulocyte % 10/05/2023 0.2  % Final   ]      Radiology Imaging:  I reviewed with the patient Esteban Cano's MRI of the lumbar spine from 12/15/2023  MRI SPINE LUMBAR (CPT=72148)  Narrative: PROCEDURE:  MRI SPINE LUMBAR (CPT=72148)     COMPARISON:  None. INDICATIONS:  S76.019A Strain of gluteus medius, unspecified laterality, initial encounter M54.16 Lumbar radiculopathy M51.36 Bulge of lumbar disc without myelopathy M48.061*     TECHNIQUE:  Multiplanar T1 and T2 weighted images including fat suppression sequences. Images acquired in sagittal and axial planes. FINDINGS:  There is normal lumbar lordosis with anatomic alignment. Vertebral body heights are well-maintained. Mild disc desiccation, minimal disc height loss, minimal endplate spurring, and minimal degenerative endplate marrow signal changes scattered in the   lumbar spine.   A few scattered Schmorl's nodes are noted.  No focal worrisome marrow signal abnormality is seen. The distal spinal cord and conus medullaris have a normal signal and morphology. The conus medullaris terminates at the mid L1 level. The roots of the cauda equina are unremarkable. The paraspinal soft tissues are unremarkable. A few scattered   probable perineural cysts are noted in the partially imaged sacrum. T12-L1:  Minimal diffuse disc bulge. There is no significant spinal canal or neural foraminal stenosis. L1-2:  Minimal diffuse disc bulge. There is no significant spinal canal or neural foraminal stenosis. L2-3 through L4-5: There is no significant abnormality. L5-S1:  Minimal diffuse disc bulge with a small superimposed broad-based central disc protrusion with corresponding annular fissure. There is no significant spinal canal or neural foraminal stenosis. Impression: CONCLUSION:  Mild degenerative changes lumbar spine as above without significant spinal canal or neural foraminal stenosis at any level. Please see above for further details. LOCATION:  [facility]        Dictated by (CST): Luis Nunn MD on 12/15/2023 at 11:38 AM       Finalized by (CST): Luis Nunn MD on 12/15/2023 at 11:41 AM         ASSESSMENT AND PLAN:  Kandace Alcala is a pleasant 50-year-old male who presents for follow-up of his low back pain and occasional radicular symptoms into the gastroc. I have reviewed his MRI with him today where he does have an annular tear and a small disc protrusion at L5-S1 which I believe is what is causing his symptoms. Is a new problem without clear prognosis. At this time, I am recommending he continue with physical therapy and home exercise program.  If his symptoms persist or recur, then I would recommend a bilateral S1 transforaminal epidural steroid injection. He will follow-up with me as needed going forward.        RTC in as needed  Discharge Instructions were provided as documented in AVS summary. The patient was in agreement with the assessment and plan. All questions were answered. There were no barriers to learning. We discussed that a telemedicine visit is in place of an office visit; however, this limits the ability to perform a thorough physical examination which may affect objective findings related to a specific condition and can affect treatment. We also discussed that NSAIDs may mask or worsen COVID-19 infection symptoms. The patient was also informed that corticosteroids, in any form, may significantly decrease immune response and may increase risk and complications of infection. The patient was advised that given the current situation with COVID-19, it is in his/her best interest to socially distance his/herself. Given this, we are not recommending any elective procedures or office visits at the outpatient surgery center or in the office respectively unless deemed necessary. My staff will be reaching out to the patient for the elective procedure when it is considered appropriate and the patient can follow-up in office as well when appropriate. In the meantime, I will be available for telephone and video encounter. 1. Myalgia    2. Mechanical low back pain    3. Lumbar spondylosis    4. DDD (degenerative disc disease), lumbosacral    5. Lumbar foraminal stenosis    6. Bulge of lumbar disc without myelopathy    7. Lumbar radiculopathy    8. Strain of gluteus medius, unspecified laterality, initial encounter    9. Annular tear of lumbar disc        Adam Murray MD  Physical Medicine and Rehabilitation/Sports Medicine  [facility]

## 2024-01-11 ENCOUNTER — HOSPITAL ENCOUNTER (OUTPATIENT)
Dept: CT IMAGING | Facility: HOSPITAL | Age: 44
Discharge: HOME | End: 2024-01-11
Attending: INTERNAL MEDICINE

## 2024-01-11 DIAGNOSIS — Z09 ENCOUNTER FOR EXAMINATION FOLLOWING TREATMENT AT HOSPITAL: ICD-10-CM

## 2024-01-11 DIAGNOSIS — R07.89 CHEST DISCOMFORT: ICD-10-CM

## 2024-01-12 NOTE — PROGRESS NOTES
Date of Service 1/11/2024    SADI LAGUNA  Date of Birth 10/6/1980    Patient Age: 43 year old    PCP: Jose Maria Bullard MD  [address] [facility] [id number]    Heart Scan Consult  Preliminary Heart Scan Score: 10.6    Previous Screening  Heart Scan Completed Previously: No                   Risk Factors  Personal Risk Factors  Non-alterable Risk Factors: Family History (Grandfather - MI in his 70's)      Body Mass Index  There is no height or weight on file to calculate BMI. BMI 25    Blood Pressure  /74 no med.  (Normal =< 120/80,  Elevated = 120-129/ >80,  High Stage1 130-139/80-89 , Stage2 >140/>90)    Lipid Profile  Cholesterol: 164, done on 10/5/2023.  HDL Cholesterol: 51, done on 10/5/2023.  LDL Cholesterol: 99, done on 10/5/2023.  TriGlycerides 73, done on 10/5/2023.    Not on med.    Cholesterol Goals  Value   Total  =< 200   HDL  = > 45 Men = > 55 Women   LDL   =< 100   Triglycerides  =< 150       Glucose and Hemoglobin A1C  Lab Results   Component Value Date    GLU 93 12/10/2023     (Normal Fasting Glucose < 100mg/dl )    Nurse Review  Risk factor information and results reviewed with Nurse: Yes    Recommended Follow Up:  Consult your physician regarding:: Final Heart Scan Report;Discuss potential for Incidental Finding      Recommendations for Change:  Nutrition Changes: Low Saturated Fat;Low Fat Dairy    Cholesterol Modification (goal of therapy depends upon your risk): Decrease LDL (Lousy/Bad) Ideal <100    Exercise: Enhance Current Program    Smoking Cessation: No Change Needed    Weight Management: Decrease Current Weight    Stress Management: Adopt Stress Management Techniques    Repeat Heart Scan: 3 Years if Calcium Score is > 0.0;Discuss with your Physician              [facility] Recommended Resources:  Recommended Resources: Upcoming Classes, Medical Services and Health Library [email or web address]            Dolores TREJO RN        Please Contact the Nurse Heart Line with any Questions  or Concerns [phone].

## 2024-06-03 ENCOUNTER — LAB ENCOUNTER (OUTPATIENT)
Dept: LAB | Facility: REFERENCE LAB | Age: 44
End: 2024-06-03
Attending: INTERNAL MEDICINE
Payer: COMMERCIAL

## 2024-06-03 ENCOUNTER — OFFICE VISIT (OUTPATIENT)
Dept: INTERNAL MEDICINE CLINIC | Facility: CLINIC | Age: 44
End: 2024-06-03

## 2024-06-03 VITALS
OXYGEN SATURATION: 100 % | RESPIRATION RATE: 17 BRPM | DIASTOLIC BLOOD PRESSURE: 78 MMHG | TEMPERATURE: 98 F | HEIGHT: 71 IN | WEIGHT: 185 LBS | SYSTOLIC BLOOD PRESSURE: 139 MMHG | HEART RATE: 74 BPM | BODY MASS INDEX: 25.9 KG/M2

## 2024-06-03 DIAGNOSIS — R51.9 CHRONIC NONINTRACTABLE HEADACHE, UNSPECIFIED HEADACHE TYPE: Primary | ICD-10-CM

## 2024-06-03 DIAGNOSIS — G89.29 CHRONIC NONINTRACTABLE HEADACHE, UNSPECIFIED HEADACHE TYPE: ICD-10-CM

## 2024-06-03 DIAGNOSIS — R51.9 CHRONIC NONINTRACTABLE HEADACHE, UNSPECIFIED HEADACHE TYPE: ICD-10-CM

## 2024-06-03 DIAGNOSIS — G89.29 CHRONIC NONINTRACTABLE HEADACHE, UNSPECIFIED HEADACHE TYPE: Primary | ICD-10-CM

## 2024-06-03 LAB
CRP SERPL-MCNC: <0.4 MG/DL (ref ?–1)
ERYTHROCYTE [SEDIMENTATION RATE] IN BLOOD: 3 MM/HR

## 2024-06-03 PROCEDURE — 86140 C-REACTIVE PROTEIN: CPT

## 2024-06-03 PROCEDURE — 85652 RBC SED RATE AUTOMATED: CPT

## 2024-06-03 PROCEDURE — 3008F BODY MASS INDEX DOCD: CPT | Performed by: INTERNAL MEDICINE

## 2024-06-03 PROCEDURE — 3078F DIAST BP <80 MM HG: CPT | Performed by: INTERNAL MEDICINE

## 2024-06-03 PROCEDURE — 3075F SYST BP GE 130 - 139MM HG: CPT | Performed by: INTERNAL MEDICINE

## 2024-06-03 PROCEDURE — 99214 OFFICE O/P EST MOD 30 MIN: CPT | Performed by: INTERNAL MEDICINE

## 2024-06-03 PROCEDURE — 36415 COLL VENOUS BLD VENIPUNCTURE: CPT

## 2024-06-03 NOTE — PROGRESS NOTES
Subjective:   Patient ID: Royal Cano is a 43 year old male.    HPI    Patient comes in today with complaint of right-sided pain we will go ahead behind the ear behind all I temporal area this is on and off problems doing well for years but lately is gotten worse patient did see a chiropractor but says he had an MRI done as per patient says MRI did not show anything acute but he does admit that he does have possible herniated disc which is old but it was not thought to be causing the symptoms as per patient patient will upload MRI results. No vision changes, pain is not debilitating and ibuprofen helps but lately its been almost daily.     History/Other:   Review of Systems   Neurological:  Positive for headaches.     Current Outpatient Medications   Medication Sig Dispense Refill    naproxen 500 MG Oral Tab Take 1 tablet (500 mg total) by mouth 2 (two) times daily as needed. 60 tablet 0    albuterol 108 (90 Base) MCG/ACT Inhalation Aero Soln Inhale 2 puffs into the lungs every 6 (six) hours as needed for Wheezing. 1 each 0     Allergies:  Allergies   Allergen Reactions    Ceclor ANAPHYLAXIS       Objective:   Physical Exam  Vitals and nursing note reviewed.   Constitutional:       Appearance: He is well-developed.   HENT:      Head: Normocephalic and atraumatic.      Right Ear: External ear normal.      Left Ear: External ear normal.      Nose: Nose normal.   Eyes:      Conjunctiva/sclera: Conjunctivae normal.      Pupils: Pupils are equal, round, and reactive to light.   Cardiovascular:      Rate and Rhythm: Normal rate and regular rhythm.      Heart sounds: Normal heart sounds.   Pulmonary:      Effort: Pulmonary effort is normal.      Breath sounds: Normal breath sounds.   Abdominal:      General: Bowel sounds are normal.      Palpations: Abdomen is soft.   Genitourinary:     Penis: Normal.       Prostate: Normal.      Rectum: Normal.   Musculoskeletal:         General: Normal range of motion.       Cervical back: Normal range of motion and neck supple.   Skin:     General: Skin is warm and dry.   Neurological:      Mental Status: He is alert and oriented to person, place, and time.      Deep Tendon Reflexes: Reflexes are normal and symmetric.         Assessment & Plan:   1. Chronic nonintractable headache, unspecified headache type will order CT head also will order?  Labs for inflammatory markers follow with neurology patient has appointment this month any worsening symptoms any changes symptoms let us know       Orders Placed This Encounter   Procedures    Sed Monica, Gricelda (Automated) [E]    C-Reactive Protein [E]       Meds This Visit:  Requested Prescriptions      No prescriptions requested or ordered in this encounter       Imaging & Referrals:  CT BRAIN OR HEAD ([id number])

## 2024-06-13 ENCOUNTER — HOSPITAL ENCOUNTER (OUTPATIENT)
Dept: CT IMAGING | Age: 44
Discharge: HOME | End: 2024-06-13
Attending: INTERNAL MEDICINE
Payer: COMMERCIAL

## 2024-06-13 DIAGNOSIS — G89.29 CHRONIC NONINTRACTABLE HEADACHE, UNSPECIFIED HEADACHE TYPE: ICD-10-CM

## 2024-06-13 DIAGNOSIS — R51.9 CHRONIC NONINTRACTABLE HEADACHE, UNSPECIFIED HEADACHE TYPE: ICD-10-CM

## 2024-06-13 PROCEDURE — 70450 CT HEAD/BRAIN W/O DYE: CPT | Performed by: INTERNAL MEDICINE

## 2024-08-04 ENCOUNTER — HOSPITAL ENCOUNTER (OUTPATIENT)
Age: 44
Discharge: HOME | End: 2024-08-04
Payer: COMMERCIAL

## 2024-08-04 VITALS
HEART RATE: 57 BPM | OXYGEN SATURATION: 100 % | SYSTOLIC BLOOD PRESSURE: 142 MMHG | RESPIRATION RATE: 18 BRPM | DIASTOLIC BLOOD PRESSURE: 78 MMHG | TEMPERATURE: 97 F

## 2024-08-04 DIAGNOSIS — L03.90 CELLULITIS, UNSPECIFIED CELLULITIS SITE: Primary | ICD-10-CM

## 2024-08-04 PROCEDURE — 99213 OFFICE O/P EST LOW 20 MIN: CPT

## 2024-08-04 PROCEDURE — 99214 OFFICE O/P EST MOD 30 MIN: CPT

## 2024-08-04 RX ORDER — DOXYCYCLINE HYCLATE 100 MG/1
100 CAPSULE ORAL 2 TIMES DAILY
Qty: 20 CAPSULE | Refills: 0 | Status: SHIPPED | OUTPATIENT
Start: 2024-08-04 | End: 2024-08-14

## 2024-08-04 NOTE — ED PROVIDER NOTES
Patient Seen in: [facility]      History     Chief Complaint   Patient presents with    Rash     Stated Complaint: Rash on Neck  Subjective:   HPI    This is a well-appearing 43-year-old male with no significant past medical history who presents with a chief complaint of pruritic rash to upper back and neck.  States he noticed it yesterday.  Today woke up and noted that the rash to the left side of his neck now has erythematous streaking.  No fever or chills.  No history of cellulitis.    Objective:   History reviewed. No pertinent past medical history.         Past Surgical History:   Procedure Laterality Date    Fracture surgery      Hernia repair                Social History     Socioeconomic History    Marital status:    Tobacco Use    Smoking status: Never    Smokeless tobacco: Never   Vaping Use    Vaping status: Never Used   Substance and Sexual Activity    Alcohol use: Yes     Comment: SOCIALLY    Drug use: Never            Review of Systems   All other systems reviewed and are negative.      Positive for stated complaint: Rash    Other systems are as noted in HPI.  Constitutional and vital signs reviewed.      All other systems reviewed and negative except as noted above.    Physical Exam     ED Triage Vitals [08/04/24 0914]   /78   Pulse 57   Resp 18   Temp 97.1 °F (36.2 °C)   Temp src Temporal   SpO2 100 %   O2 Device None (Room air)     Current:/78   Pulse 57   Temp 97.1 °F (36.2 °C) (Temporal)   Resp 18   SpO2 100%     Physical Exam  Vitals and nursing note reviewed.   Constitutional:       General: He is awake. He is not in acute distress.     Appearance: Normal appearance. He is not ill-appearing, toxic-appearing or diaphoretic.   HENT:      Head: Normocephalic and atraumatic.      Right Ear: Tympanic membrane, ear canal and external ear normal.      Left Ear: Tympanic membrane, ear canal and external ear normal.      Nose: Nose normal.      Mouth/Throat:       Mouth: Mucous membranes are moist.      Pharynx: Oropharynx is clear. Uvula midline.   Eyes:      General: Lids are normal.      Extraocular Movements: Extraocular movements intact.      Conjunctiva/sclera: Conjunctivae normal.      Pupils: Pupils are equal, round, and reactive to light.   Cardiovascular:      Rate and Rhythm: Normal rate and regular rhythm.      Pulses: Normal pulses.      Heart sounds: Normal heart sounds.   Pulmonary:      Effort: Pulmonary effort is normal.      Breath sounds: Normal breath sounds and air entry. No stridor, decreased air movement or transmitted upper airway sounds.   Skin:     General: Skin is warm and dry.      Capillary Refill: Capillary refill takes less than 2 seconds.      Findings: Rash present. Rash is papular.      Comments: Multiple papular lesions to the upper back    Neurological:      General: No focal deficit present.      Mental Status: He is alert and oriented to person, place, and time.   Psychiatric:         Mood and Affect: Mood normal.         Behavior: Behavior normal. Behavior is cooperative.         Thought Content: Thought content normal.         Judgment: Judgment normal.           ED Course     No results found.  Labs Reviewed - No data to display    MDM     Medical Decision Making  Differential diagnoses reflecting the complexity of care include but are not limited to cellulitis, localized reaction to insect bite, lymphangitis.  Comorbidities that add complexity to management include: N/A  History obtained by an independent source was from: N/A  Discussions of management was done with: N/A  My independent interpretations of studies include: N/A  Shared decision making was done by: Patient and Myself   Patient is well appearing, non-toxic and in no acute distress.  Vital signs are stable.  Discussed with patient I would treat with doxycycline and Bactroban.  He is afebrile, vital signs normal.  Nontoxic in appearance.  Discussed any increase in erythema,  fever, chills he should be seen in the emergency department.    Risk  Prescription drug management.        Disposition and Plan     Clinical Impression:  1. Cellulitis, unspecified cellulitis site         Disposition:  Discharge  8/4/2024  9:38 am    Follow-up:  Jose Maria Bullard MD  [address] [facility] [address]  [phone]                Medications Prescribed:  Discharge Medication List as of 8/4/2024  9:38 AM        START taking these medications    Details   mupirocin 2 % External Ointment Apply 1 Application topically 3 (three) times daily for 14 days., Normal, Disp-1 each, R-0      doxycycline 100 MG Oral Cap Take 1 capsule (100 mg total) by mouth 2 (two) times daily for 10 days., Normal, Disp-20 capsule, R-0                Note to patient: The [employer] act makes medical notes like these available to patients in the interest of transparency.  However, be advised this medical document and is intended as peer to peer communication.  It is read the medical language and may contain abbreviations or verbiage that are unfamiliar.  It may appear blunt or direct.  Medical documents are intended to carry relevant information, fax is evident and the clinical opinion of the practitioner.    This note was prepared using Dragon Medical voice recognition dictation software.  As a result, errors may occur.  When identified, these errors have been corrected.  While every attempt is made to correct errors during dictation, discrepancies may still exist.    Miley Lopez, SVETLANA  8/4/2024  9:36 AM

## 2024-08-04 NOTE — ED INITIAL ASSESSMENT (HPI)
Patient arrives ambulatory with c/o itchy spots to neck/upper back x yesterday. Reports this morning he saw a red spot on neck with a red line going down.

## 2024-08-04 NOTE — DISCHARGE INSTRUCTIONS
Please take medication as prescribed.  Any fever, chills, worsening symptoms please go to the emergency department.

## 2024-08-16 ENCOUNTER — HOSPITAL ENCOUNTER (OUTPATIENT)
Age: 44
Discharge: HOME | End: 2024-08-16
Payer: COMMERCIAL

## 2024-08-16 VITALS
RESPIRATION RATE: 16 BRPM | OXYGEN SATURATION: 100 % | HEART RATE: 62 BPM | DIASTOLIC BLOOD PRESSURE: 74 MMHG | SYSTOLIC BLOOD PRESSURE: 131 MMHG | TEMPERATURE: 98 F

## 2024-08-16 DIAGNOSIS — T63.441A LOCAL REACTION TO BEE STING, ACCIDENTAL OR UNINTENTIONAL, INITIAL ENCOUNTER: Primary | ICD-10-CM

## 2024-08-16 PROCEDURE — 99213 OFFICE O/P EST LOW 20 MIN: CPT

## 2024-08-16 RX ORDER — TRIAMCINOLONE ACETONIDE 1 MG/G
CREAM TOPICAL 2 TIMES DAILY
Qty: 30 G | Refills: 0 | Status: SHIPPED | OUTPATIENT
Start: 2024-08-16

## 2024-08-16 NOTE — ED PROVIDER NOTES
Patient Seen in: [facility]      History     Chief Complaint   Patient presents with    Bite     Stated Complaint: rash    Subjective:   HPI    Patient is a 43-year-old male, presenting to immediate care for evaluation of rash on left anterior elbow crease/forearm.  Onset: 3-4 days.  Rash is red, raised, itchy, warm.  Became concerned with noted red streaking to left axilla.  Patient had similar episode on 8/4/2024 for similar symptoms on left shoulder and was treated with oral antibiotics doxycycline and topical mupirocin.  Endorses minimal improvement with oral antibiotics at that time.  Rash and symptoms resolved.  He is coming to immediate care for further evaluation.  Denies any fevers or chills or myalgias.  No associated joint swelling.  No drainage.  Not diabetic.  Not immunocompromise.  He is regularly outdoors including doing yard work, bicycling, etc.        Objective:   History reviewed. No pertinent past medical history.           Past Surgical History:   Procedure Laterality Date    Fracture surgery      Hernia repair                  Social History     Socioeconomic History    Marital status:    Tobacco Use    Smoking status: Never    Smokeless tobacco: Never   Vaping Use    Vaping status: Never Used   Substance and Sexual Activity    Alcohol use: Yes     Comment: SOCIALLY    Drug use: Never              Review of Systems   Constitutional:  Negative for activity change, appetite change, chills and fever.   Gastrointestinal:  Negative for abdominal pain, nausea and vomiting.   Musculoskeletal:  Negative for joint swelling.   Skin:  Positive for color change and rash.   Allergic/Immunologic: Negative for immunocompromised state.   Psychiatric/Behavioral:  Negative for confusion.        Positive for stated Chief Complaint: Bite    Other systems are as noted in HPI.  Constitutional and vital signs reviewed.      All other systems reviewed and negative except as noted  above.    Physical Exam     ED Triage Vitals [08/16/24 1141]   /74   Pulse 62   Resp 16   Temp 98.2 °F (36.8 °C)   Temp src Temporal   SpO2 100 %   O2 Device None (Room air)       Current Vitals:   Vital Signs  BP: 131/74  Pulse: 62  Resp: 16  Temp: 98.2 °F (36.8 °C)  Temp src: Temporal    Oxygen Therapy  SpO2: 100 %  O2 Device: None (Room air)            Physical Exam  Vitals and nursing note reviewed.   Constitutional:       General: He is not in acute distress.     Appearance: Normal appearance. He is not ill-appearing, toxic-appearing or diaphoretic.   HENT:      Head: Normocephalic and atraumatic.      Mouth/Throat:      Mouth: Mucous membranes are moist.   Eyes:      Conjunctiva/sclera: Conjunctivae normal.   Cardiovascular:      Pulses: Normal pulses.   Pulmonary:      Effort: No respiratory distress.   Musculoskeletal:         General: Normal range of motion.   Skin:     Findings: Erythema and rash present.      Comments: Small hyperpigmented papular blister with surrounding wheal erythema and warmth left anterior cubital fossa.  Compartments soft.  Blanchable.  Nonvesicular.  Nondraining.   Neurological:      General: No focal deficit present.      Mental Status: He is alert.      Motor: No weakness.      Coordination: Coordination normal.      Gait: Gait normal.   Psychiatric:         Mood and Affect: Mood normal.         Behavior: Behavior normal.             ED Course   Labs Reviewed - No data to display         MDM        Exam consistent with insect bite with localized reaction.  Rash appears same as oak itch mite rash secondary to recent cicada emergence.  Low suspicion for underlying infection.  No fever or systemic symptoms.  Nondraining wound.  No induration or fluctuance or drainage.  Will treat outpatient supportively with topical steroids.  Discussed self-limited nature.  PCP follow-up.  Return precautions including worsening symptoms and signs of infection.  Stable for  discharge                                     Medical Decision Making      Disposition and Plan     Clinical Impression:  1. Local reaction to bee sting, accidental or unintentional, initial encounter         Disposition:  Discharge  8/16/2024 12:07 pm    Follow-up:  Jose Maria Bullard MD  [address] [place] [id number]  [phone]                Medications Prescribed:  Current Discharge Medication List        START taking these medications    Details   triamcinolone 0.1 % External Cream Apply topically 2 (two) times daily.  Qty: 30 g, Refills: 0

## 2024-08-16 NOTE — ED INITIAL ASSESSMENT (HPI)
Patient arrives ambulatory with c/o bug bite to left forearm that is red, warm to touch x 3-4 days. Denies fevers.

## 2024-09-24 ENCOUNTER — OFFICE VISIT (OUTPATIENT)
Dept: INTERNAL MEDICINE CLINIC | Facility: CLINIC | Age: 44
End: 2024-09-24

## 2024-09-24 VITALS
TEMPERATURE: 98 F | HEART RATE: 77 BPM | HEIGHT: 71 IN | BODY MASS INDEX: 25.48 KG/M2 | SYSTOLIC BLOOD PRESSURE: 132 MMHG | DIASTOLIC BLOOD PRESSURE: 74 MMHG | RESPIRATION RATE: 18 BRPM | WEIGHT: 182 LBS | OXYGEN SATURATION: 99 %

## 2024-09-24 DIAGNOSIS — R51.9 CHRONIC NONINTRACTABLE HEADACHE, UNSPECIFIED HEADACHE TYPE: Primary | ICD-10-CM

## 2024-09-24 DIAGNOSIS — G43.809 OTHER MIGRAINE WITHOUT STATUS MIGRAINOSUS, NOT INTRACTABLE: ICD-10-CM

## 2024-09-24 DIAGNOSIS — G89.29 CHRONIC NONINTRACTABLE HEADACHE, UNSPECIFIED HEADACHE TYPE: Primary | ICD-10-CM

## 2024-09-24 PROCEDURE — 3075F SYST BP GE 130 - 139MM HG: CPT | Performed by: INTERNAL MEDICINE

## 2024-09-24 PROCEDURE — 99214 OFFICE O/P EST MOD 30 MIN: CPT | Performed by: INTERNAL MEDICINE

## 2024-09-24 PROCEDURE — 3078F DIAST BP <80 MM HG: CPT | Performed by: INTERNAL MEDICINE

## 2024-09-24 PROCEDURE — 3008F BODY MASS INDEX DOCD: CPT | Performed by: INTERNAL MEDICINE

## 2024-09-24 RX ORDER — RIZATRIPTAN BENZOATE 10 MG/1
TABLET, ORALLY DISINTEGRATING ORAL
Qty: 10 TABLET | Refills: 2 | Status: SHIPPED | OUTPATIENT
Start: 2024-09-24

## 2024-09-24 NOTE — PROGRESS NOTES
Subjective:     Patient ID: Royal Cano is a 43 year old male.    HPI  Pt comes in for follow up due to ongoing headache usually on the rt side, seen neuro had ct and diagnosed with migraine and prescribed as need med but he never picked it up. No specific causes that he can tell but he does admit that he dose not sleep well and alos does not drink enough fluids. Takeas tylenol as need but usually does not help much, pain can last for days some times.   History/Other:   Review of Systems   Constitutional: Negative.    HENT: Negative.     Eyes: Negative.    Respiratory: Negative.     Cardiovascular: Negative.    Gastrointestinal: Negative.    Genitourinary: Negative.    Musculoskeletal: Negative.    Skin: Negative.    Neurological:  Positive for headaches.   Psychiatric/Behavioral: Negative.       Current Outpatient Medications   Medication Sig Dispense Refill    rizatriptan 10 MG Oral Tablet Dispersible 1/2 to 1 tab as need daily 10 tablet 2    naproxen 500 MG Oral Tab Take 1 tablet (500 mg total) by mouth 2 (two) times daily as needed. 60 tablet 0    albuterol 108 (90 Base) MCG/ACT Inhalation Aero Soln Inhale 2 puffs into the lungs every 6 (six) hours as needed for Wheezing. 1 each 0    triamcinolone 0.1 % External Cream Apply topically 2 (two) times daily. (Patient not taking: Reported on 9/24/2024) 30 g 0     Allergies:  Allergies   Allergen Reactions    Ceclor ANAPHYLAXIS    Cephalosporins UNKNOWN       No past medical history on file.   Past Surgical History:   Procedure Laterality Date    Fracture surgery      Hernia repair        Family History   Problem Relation Age of Onset    High Blood Pressure Father       Social History:   Social History     Socioeconomic History    Marital status:    Tobacco Use    Smoking status: Never     Passive exposure: Never    Smokeless tobacco: Never   Vaping Use    Vaping status: Never Used   Substance and Sexual Activity    Alcohol use: Yes     Comment:  SOCIALLY    Drug use: Never        Objective:   Physical Exam  Vitals and nursing note reviewed.   Constitutional:       Appearance: He is well-developed.   HENT:      Head: Normocephalic and atraumatic.      Right Ear: External ear normal.      Left Ear: External ear normal.      Nose: Nose normal.   Eyes:      Conjunctiva/sclera: Conjunctivae normal.      Pupils: Pupils are equal, round, and reactive to light.   Cardiovascular:      Rate and Rhythm: Normal rate and regular rhythm.      Heart sounds: Normal heart sounds.   Pulmonary:      Effort: Pulmonary effort is normal.      Breath sounds: Normal breath sounds.   Abdominal:      General: Bowel sounds are normal.      Palpations: Abdomen is soft.   Genitourinary:     Penis: Normal.       Prostate: Normal.      Rectum: Normal.   Musculoskeletal:         General: Normal range of motion.      Cervical back: Normal range of motion and neck supple.   Skin:     General: Skin is warm and dry.   Neurological:      Mental Status: He is alert and oriented to person, place, and time.      Deep Tendon Reflexes: Reflexes are normal and symmetric.         Assessment & Plan:   1. Chronic nonintractable headache, unspecified headache type - will refer to neuro for 2-nd opinion alos will start o prophylactic medication    2. Other migraine without status migrainosus, not intractable - as above , also increase sleep and increase water intake        No orders of the defined types were placed in this encounter.      Meds This Visit:  Requested Prescriptions     Signed Prescriptions Disp Refills    rizatriptan 10 MG Oral Tablet Dispersible 10 tablet 2     Si/2 to 1 tab as need daily       Imaging & Referrals:  NEURO - INTERNAL

## 2024-11-01 ENCOUNTER — MED REC SCAN ONLY (OUTPATIENT)
Dept: INTERNAL MEDICINE CLINIC | Facility: CLINIC | Age: 44
End: 2024-11-01

## 2024-11-27 NOTE — PROGRESS NOTES
[facility] - Gastroenterology                                                                                                               Reason for consult: eval    Requesting physician or provider: Jose Maria Bullard MD    Chief Complaint   Patient presents with    Follow - Up       HPI:   Royal Cano is a 44 year old year-old male without significant PMHx:     he is here today for f/U throat discomfort    He says since he scheduled appt sx have improved. He tthinks may have been related to cold.   Mild gerd w/ occasional vocal hoarseness.   Not on ppi  Did try elimination diet - he says identified tomato as trigger.   he denies dysphagia, odynophagia and/or globus. he denies abdominal pain. he denies nausea and/or vomiting.  he denies recent change in appetite and/or unintentional weight loss.    he moves his bowels 1-2 times per day and without recent change. he denies straining and/or incomplete evacuation.  he denies brbpr and/or melena.    ultrasound ruq 12/2022.  Stable hemangioma.  No further imaging needed at this time.    Started seeing functional medicine specialist.      NSAIDS:no  Tobacco: no  Alcohol:3-4/week  Marijuana: no  Illicit drugs: no     No FDR w/ GI malignancy  2nd cousin recently passed from colon ca  Paternal GM had gastric ca, diverticulitis  No fhx ibd, celiac  No fhx pancreatic issues     No history of adverse reaction to sedation  No WILVER  No anticoagulants  No pacemaker/defibrillator  No pain medications and/or sleep aides       cln/egd 2017 and recalls was normal, was told had reduced tightness of LES  Was told to modify diet and take fiber supplement    Cln/egd 5/2022    EGD findings:       1. Esophagus: The squamocolumnar junction was noted at 44 cm and appeared regular. The GE junction was noted at 44 cm from the incisors. There was a 2 cm sliding hiatal hernia. There was a wide open ring dilated with CRE balloon to 20 with some  small tearing. Distal and mid esophageal biopsies taken for EOE  2. Stomach: The stomach distended normally. Normal rugal folds were seen. The pylorus was patent. The gastric mucosa appeared erythematous so biopsied for Hpylori. Retroflexion revealed a normal fundus and a normal cardia.   3. Duodenum: The duodenal mucosa appeared normal in the 1st and 2nd portion of the duodenum. Biopsied for celiac     Colonoscopy findings:     1. INÉS: normal rectal tone, no masses palpated.   2.  1 polyp(s) noted as follows:      A. 5 mm polyp in the ascending colon; flat morphology; cold biopsy polypectomy and retrieved.  3. Terminal ileum: the visualized mucosa appeared normal.  4. The colonic mucosa throughout the colon showed normal vascular pattern, without evidence of angioectasias or inflammation. Random biopsies taken for microscopic colitis  5. Diverticulosis: none noted.  6. A retroflexed view of the rectum revealed hemorrhoids.        Impression:  R/o EOE  S/p dilation     Recommend:  Await pathology. The interval for the next colonoscopy will be determined after reviewing pathology. If new signs or symptoms develop, colonoscopy may need to be repeated sooner.   High fiber diet.  Monitor for blood in the stool. If having more than just tinge of blood, call office or go to the ER.  Here are some reflux precautions:   - Avoid trigger foods  - Anti-reflux measures: raising the head of the bed at night, avoiding tight clothing or belts, avoiding eating late at night and not lying down shortly after mealtime and achieving weight loss   - Avoid NSAID's, caffeine, peppermints, alcohol, tobacco and foods that incite symptoms   PPI daily before breakfast    Final Diagnosis:      A. Duodenum; biopsy:  Multiple fragments of small bowel mucosa demonstrating no significant pathologic changes.  Normal villous architecture present.  No evidence of celiac sprue, granuloma, dysplasia, or malignancy is identified.     B. Gastric  biopsy:  Fragments of gastric mucosa with mild inactive chronic inflammation.  Diff Quik stain (with appropriate control reactivity) is negative for H pylori microorganisms.     C. Distal esophagus; biopsy:  Multiple fragments of squamous mucosa with mild chronic inflammation and markedly increased eosinophils (28 eosinophils per high power field).  No evidence of goblet cell intestinal metaplasia, dysplasia, or malignancy is identified.     D. Mid esophagus; biopsy:  Multiple fragments of squamous mucosa with mild chronic inflammation and moderately increased eosinophils (18 eosinophils per high power field).  No evidence of goblet cell intestinal metaplasia, dysplasia, or malignancy is identified.     E. Random colon; biopsy:   Multiple fragments of colonic mucosa with mild nonspecific chronic inflammation.  No evidence of microscopic colitis, significant glandular distortion, acute cryptitis, granuloma, dysplasia, or malignancy is identified.     F. Ascending colon polyp:   Fragments of sessile serrated adenoma.     Comment:  The morphologic findings of the distal and mid esophagus biopsies (specimens C and D), are most compatible with eosinophilic esophagitis in the appropriate clinical and endoscopic setting.  Clinical and endoscopic correlations are recommended.         Wt Readings from Last 6 Encounters:   12/02/24 180 lb (81.6 kg)   09/24/24 182 lb (82.6 kg)   06/03/24 185 lb (83.9 kg)   12/12/23 182 lb (82.6 kg)   11/15/23 182 lb (82.6 kg)   11/15/23 182 lb (82.6 kg)        History, Medications, Allergies, ROS:      No past medical history on file.   Past Surgical History:   Procedure Laterality Date    Fracture surgery      Hernia repair        Family Hx:   Family History   Problem Relation Age of Onset    High Blood Pressure Father       Social History:   Social History     Socioeconomic History    Marital status:    Tobacco Use    Smoking status: Never     Passive exposure: Never    Smokeless  tobacco: Never   Vaping Use    Vaping status: Never Used   Substance and Sexual Activity    Alcohol use: Yes     Comment: SOCIALLY    Drug use: Never        Medications (Active prior to today's visit):  Current Outpatient Medications   Medication Sig Dispense Refill    Omeprazole 40 MG Oral Capsule Delayed Release Take 1 capsule (40 mg total) by mouth daily. Take 1 capsule by mouth daily before breakfast. 30 capsule 11    rizatriptan 10 MG Oral Tablet Dispersible 1/2 to 1 tab as need daily (Patient not taking: Reported on 12/2/2024) 10 tablet 2    naproxen 500 MG Oral Tab Take 1 tablet (500 mg total) by mouth 2 (two) times daily as needed. (Patient not taking: Reported on 12/2/2024) 60 tablet 0    albuterol 108 (90 Base) MCG/ACT Inhalation Aero Soln Inhale 2 puffs into the lungs every 6 (six) hours as needed for Wheezing. (Patient not taking: Reported on 12/2/2024) 1 each 0       Allergies:  Allergies[1]    ROS:   CONSTITUTIONAL: negative for fevers, chills, sweats and weight loss  EYES Negative for red eyes, yellow eyes, changes in vision  HEENT: Negative for dysphagia and hoarseness  RESPIRATORY: Negative for cough and shortness of breath  CARDIOVASCULAR: Negative for chest pain, palpitations  GASTROINTESTINAL: See HPI  GENITOURINARY: Negative for dysuria and frequency  MUSCULOSKELETAL: Negative for arthralgias and myalgias  NEUROLOGICAL: Negative for dizziness and headaches  BEHAVIOR/PSYCH: Negative for anxiety and poor appetite    PHYSICAL EXAM:   Blood pressure 130/81, weight 180 lb (81.6 kg).    GEN: WD/WN, NAD  HEENT: Supple symmetrical, trachea midline  CV: RRR, the extremities are warm and well perfused   LUNGS: No increased work of breathing  ABDOMEN: No scars, normal bowel sounds, soft, non-tender, non-distended no rebound or guarding, no masses, no hepatomegaly  MSK: No redness, no warmth, no swelling of joints  SKIN: No jaundice, no erythema, no rashes  HEMATOLOGIC: No bleeding, no bruising  NEURO:  Alert and interactive, normal gait    Labs/Imaging/Procedures:     Patient's pertinent labs and imaging were reviewed and discussed with patient today.        .  ASSESSMENT/PLAN:   Royal Cano is a 44 year old year-old male without significant PMHx:     #crc screening  Recommendation to repeat colonoscopy in 7 years. Due 5/2029 for crc screening.    #EoE  #gerd  He reports recent throat discomfort seemingly related to viral illness.  Does have mild reflux.No dysphagia,  Is not on ppi.  EGD 2022 - findings likely c/w allergies AND acid reflux inflammation.  Recommendation to repeat EGD 8-12 weeks. Plan for procedure now for surveillance of eoe.    -reflux diet modification  -omeprazole 40 mg/daily - he declined ppi therapy  -avoid hard solids  -extra care with chewing, swallowing  -er if condition decline    Egd w/ [place] w/ Dr. Orozco w/ possible dil  Dx: gerd, eoe      Orders This Visit:  No orders of the defined types were placed in this encounter.      Meds This Visit:  Requested Prescriptions     Signed Prescriptions Disp Refills    Omeprazole 40 MG Oral Capsule Delayed Release 30 capsule 11     Sig: Take 1 capsule (40 mg total) by mouth daily. Take 1 capsule by mouth daily before breakfast.       Imaging & Referrals:  None    ENDOSCOPIC RISK BENEFIT DISCUSSION: I described the procedure in great detail with the patient. I discussed the risks and benefits, including but not limited to: bleeding, perforation, infection, anesthesia complications, and even death. Patient will be NPO after midnight and will have a person physically present at time of pick-up to drive patient home. Patient verbalized understanding and agrees to proceed with procedure as planned.    Cora Salas, APRN   11/27/2024        This note was partially prepared using Dragon Medical voice recognition dictation software. As a result, errors may occur. When identified, these errors have been corrected. While every attempt is made to  correct errors during dictation, discrepancies may still exist.          [1]   Allergies  Allergen Reactions    Ceclor ANAPHYLAXIS    Cephalosporins UNKNOWN

## 2024-12-02 ENCOUNTER — OFFICE VISIT (OUTPATIENT)
Facility: CLINIC | Age: 44
End: 2024-12-02

## 2024-12-02 ENCOUNTER — TELEPHONE (OUTPATIENT)
Facility: CLINIC | Age: 44
End: 2024-12-02

## 2024-12-02 VITALS — BODY MASS INDEX: 25 KG/M2 | DIASTOLIC BLOOD PRESSURE: 81 MMHG | SYSTOLIC BLOOD PRESSURE: 130 MMHG | WEIGHT: 180 LBS

## 2024-12-02 DIAGNOSIS — K20.0 EOSINOPHILIC ESOPHAGITIS: Primary | ICD-10-CM

## 2024-12-02 DIAGNOSIS — K21.9 GASTROESOPHAGEAL REFLUX DISEASE, UNSPECIFIED WHETHER ESOPHAGITIS PRESENT: ICD-10-CM

## 2024-12-02 DIAGNOSIS — Z12.11 COLON CANCER SCREENING: ICD-10-CM

## 2024-12-02 PROBLEM — J45.901 MODERATE ASTHMA WITH EXACERBATION, UNSPECIFIED WHETHER PERSISTENT (HCC): Status: ACTIVE | Noted: 2024-12-02

## 2024-12-02 PROCEDURE — 3079F DIAST BP 80-89 MM HG: CPT | Performed by: NURSE PRACTITIONER

## 2024-12-02 PROCEDURE — 3075F SYST BP GE 130 - 139MM HG: CPT | Performed by: NURSE PRACTITIONER

## 2024-12-02 PROCEDURE — 99215 OFFICE O/P EST HI 40 MIN: CPT | Performed by: NURSE PRACTITIONER

## 2024-12-02 RX ORDER — OMEPRAZOLE 40 MG/1
40 CAPSULE, DELAYED RELEASE ORAL DAILY
Qty: 30 CAPSULE | Refills: 11 | Status: SHIPPED | OUTPATIENT
Start: 2024-12-02

## 2024-12-02 NOTE — TELEPHONE ENCOUNTER
Scheduled for:  Esophagogastroduodenoscopy with Dilatation 33067/[id number]    Provider Name:   Ernesto    Date:  12/6/2024    Location:    [facility]    Sedation:  MAC    Time:  2:10 pm (Patient made aware EM will call the day before with procedure/arrival time)    Prep:  NPO after midnight    Meds/Allergies Reconciled?:  Physician reviewed     Diagnosis with codes:  Eosinophilic esophagitis [K20.0]   Gastroesophageal reflux disease, unspecified whether esophagitis present [K21.9      Was patient informed to call insurance with codes (Y/N):  Yes, I confirmed [facility] insurance with the patient.     Referral sent?:  N/A    EM or [facility] notified?:  I sent an electronic request to Endo Scheduling and received a confirmation today.      Medication Orders:  This patient verbally confirmed that he is not taking:   Iron, blood thinners, BP meds, and is not diabetic   No latex allergy, No PCN allergy and does not have a pacemaker     Misc Orders:       Further instructions given by staff:   I discussed the prep instructions with the patient which ** verbally understood and is aware that I will send the instructions today via [employer].    Advised patient:    You will not be able to drive, operate machinery or make critical decisions the day of your procedure. Please make arrangements for transportation. You must have a  (age 18 or older) to accompany you, stay in the facility for the duration of your procedure and drive you home after the procedure.  You cannot use public transportation (Uber, Lyft, Taxi). The procedure involves sedation, and you will not be allowed to leave unaccompanied. Your procedure will not proceed forward if you're unable to confirm your  planned to escort you home.    Advised Patient:    [facility] requires payment of copay and any patient responsibility at the time of registration.   The [facility] requires copay and 50% of the patient responsibility at the time of service for all Esophagogastroduodenoscopy and  diagnostic Colonoscopies.     They do offer payment plans and Care Credit options if unable to pay the full amount at the time of registration.     If you have any questions regarding your potential responsibility, please contact [facility] Insurance Department at [phone] option 1.    You may receive 4 bills related to your medical procedure:   [facility] (the facility)  The procedural physician  The anesthesiologist  The pathology lab (if applicable)

## 2024-12-02 NOTE — PATIENT INSTRUCTIONS
-c-scope 5/2029  -reflux diet modification  -omeprazole 40 mg/daily  -avoid hard solids  -extra care with chewing, swallowing  -er if condition decline    Egd w/ [place] w/ Dr. Orozco w/ possible dil  Dx: gerd, eoe    Tips to Control Acid Reflux    To control acid reflux, you’ll need to make some basic diet and lifestyle changes. The simple steps outlined below may be all you’ll need to ease discomfort.   Watch what you eat  Don't have fatty foods or spicy foods.  Eat fewer acidic foods, such as citrus and tomato-based foods. These can increase symptoms.  Limit drinking alcohol, caffeine, and fizzy beverages. All increase acid reflux.  Try limiting chocolate, peppermint, and spearmint. These can make acid reflux worse in some people.     Watch when you eat  Don't lie down for 3 hours after eating.  Don't snack before going to bed.     Raise your head  Raising your head and upper body by 4 to 6 inches helps limit reflux when you’re lying down. Put blocks under the head of your bed frame or a wedge under your mattress to raise it.   Other changes  Lose weight, if you need to  Don’t exercise near bedtime  Don't wear tight-fitting clothes  Limit aspirin and ibuprofen  Stop smoking     [employer] last reviewed this educational content on 6/1/2019  © 2423-8890 [employer]. [address] [place] [address], [place] [address]. All rights reserved. This information is not intended as a substitute for professional medical care. Always follow your healthcare professional's instructions.

## 2024-12-02 NOTE — TELEPHONE ENCOUNTER
Schedulers- Patient was seen in office today, please call patient to schedule procedure per providers orders below. I reviewed and handed a copy of prep instructions with patient in office as well as medications. Patient is aware of different locations and our providers possibly booking out. No further questions asked.      Egd w/ mac w/ Dr. Orozco w/ possible dil  Dx: gerd, eoe

## 2024-12-03 ENCOUNTER — TELEPHONE (OUTPATIENT)
Facility: CLINIC | Age: 44
End: 2024-12-03

## 2024-12-03 NOTE — TELEPHONE ENCOUNTER
Left message for patient to call back to reschedule Esophagogastroduodenoscopy with Dilation     Please transfer call to GI surgery scheduling

## 2024-12-10 PROCEDURE — 88305 TISSUE EXAM BY PATHOLOGIST: CPT | Performed by: INTERNAL MEDICINE

## 2024-12-10 PROCEDURE — 88312 SPECIAL STAINS GROUP 1: CPT | Performed by: INTERNAL MEDICINE

## 2024-12-26 ENCOUNTER — TELEPHONE (OUTPATIENT)
Facility: CLINIC | Age: 44
End: 2024-12-26

## 2024-12-26 DIAGNOSIS — R13.10 DYSPHAGIA, UNSPECIFIED TYPE: ICD-10-CM

## 2024-12-26 DIAGNOSIS — K20.0 EOSINOPHILIC ESOPHAGITIS: Primary | ICD-10-CM

## 2024-12-26 DIAGNOSIS — K21.9 GASTROESOPHAGEAL REFLUX DISEASE, UNSPECIFIED WHETHER ESOPHAGITIS PRESENT: ICD-10-CM

## 2024-12-26 NOTE — TELEPHONE ENCOUNTER
Patient does not want to repeat EGD at this time. He states he will schedule office visit with Cora

## 2024-12-26 NOTE — TELEPHONE ENCOUNTER
----- Message from Mu Orozco sent at 12/24/2024  1:14 PM CST -----  Repeat EGD in 12 weeks    Schedule EGD with MAC [Diagnosis: EOE, reflux and dysphagia]    Continue all medications for procedure except  ## If MAC @ [facility]/NE:    - NO alcohol, recreational drugs nor erectile dysfunction mediations 72 hours before procedure(s)   - NO herbal supplements or weight loss medications x 7 days prior to the procedure(s)

## 2025-02-12 ENCOUNTER — OFFICE VISIT (OUTPATIENT)
Dept: INTERNAL MEDICINE CLINIC | Facility: CLINIC | Age: 45
End: 2025-02-12

## 2025-02-12 VITALS
OXYGEN SATURATION: 99 % | HEART RATE: 69 BPM | RESPIRATION RATE: 17 BRPM | SYSTOLIC BLOOD PRESSURE: 122 MMHG | BODY MASS INDEX: 25.62 KG/M2 | DIASTOLIC BLOOD PRESSURE: 80 MMHG | WEIGHT: 183 LBS | HEIGHT: 71 IN | TEMPERATURE: 98 F

## 2025-02-12 DIAGNOSIS — K20.0 EOSINOPHILIC ESOPHAGITIS: ICD-10-CM

## 2025-02-12 DIAGNOSIS — M54.41 CHRONIC BILATERAL LOW BACK PAIN WITH RIGHT-SIDED SCIATICA: ICD-10-CM

## 2025-02-12 DIAGNOSIS — Z28.21 FLU VACCINE REFUSED: ICD-10-CM

## 2025-02-12 DIAGNOSIS — J45.901 MODERATE ASTHMA WITH EXACERBATION, UNSPECIFIED WHETHER PERSISTENT (HCC): ICD-10-CM

## 2025-02-12 DIAGNOSIS — G89.29 CHRONIC BILATERAL LOW BACK PAIN WITH RIGHT-SIDED SCIATICA: ICD-10-CM

## 2025-02-12 DIAGNOSIS — R51.9 CHRONIC NONINTRACTABLE HEADACHE, UNSPECIFIED HEADACHE TYPE: ICD-10-CM

## 2025-02-12 DIAGNOSIS — Z00.00 ANNUAL PHYSICAL EXAM: Primary | ICD-10-CM

## 2025-02-12 DIAGNOSIS — G89.29 CHRONIC NONINTRACTABLE HEADACHE, UNSPECIFIED HEADACHE TYPE: ICD-10-CM

## 2025-02-12 PROCEDURE — 3008F BODY MASS INDEX DOCD: CPT | Performed by: INTERNAL MEDICINE

## 2025-02-12 PROCEDURE — 3074F SYST BP LT 130 MM HG: CPT | Performed by: INTERNAL MEDICINE

## 2025-02-12 PROCEDURE — 3079F DIAST BP 80-89 MM HG: CPT | Performed by: INTERNAL MEDICINE

## 2025-02-12 PROCEDURE — 99396 PREV VISIT EST AGE 40-64: CPT | Performed by: INTERNAL MEDICINE

## 2025-02-12 RX ORDER — ALBUTEROL SULFATE 90 UG/1
2 INHALANT RESPIRATORY (INHALATION) EVERY 6 HOURS PRN
Qty: 1 EACH | Refills: 0 | Status: SHIPPED | OUTPATIENT
Start: 2025-02-12

## 2025-02-12 NOTE — PROGRESS NOTES
Subjective:     Patient ID: Royal Cano is a 44 year old male.    Patient comes in for annual physical overall doing okay denies any new complaints.  He saw a new neurologist recently since the NT migraine medication was not working notes and recommendation seen thinking the headache could be due to occipital neuralgia patient had CTs neck head without any significant finding.  Says pain comes and goes.  Patient also recently had an EGD done as per pathology reports and GI notes he has eosinophilic esophagitis patient says he has not been taking the omeprazole but willing to start taking it he supposed to follow back with GI in 3.  Months for a repeat EGD from last one.   Patient has a form and needs to be filled for life insurance policy  Patient also with chronic low back pain history stable done therapy taking as needed medication        History/Other:   Review of Systems   Constitutional: Negative.  Negative for fatigue and fever.   HENT: Negative.  Negative for congestion.    Eyes: Negative.    Respiratory: Negative.  Negative for cough, shortness of breath and wheezing.    Cardiovascular: Negative.  Negative for chest pain, palpitations and leg swelling.   Gastrointestinal: Negative.    Endocrine: Negative for cold intolerance and heat intolerance.   Genitourinary: Negative.  Negative for dysuria, flank pain and hematuria.   Musculoskeletal: Negative.  Negative for arthralgias, back pain and myalgias.   Skin: Negative.    Neurological: Negative.  Negative for dizziness, tremors, syncope, weakness and headaches.   Psychiatric/Behavioral: Negative.  Negative for agitation, behavioral problems and suicidal ideas. The patient is not nervous/anxious.      Current Outpatient Medications   Medication Sig Dispense Refill    albuterol 108 (90 Base) MCG/ACT Inhalation Aero Soln Inhale 2 puffs into the lungs every 6 (six) hours as needed for Wheezing. 1 each 0    Omeprazole 40 MG Oral Capsule Delayed Release  Take 1 capsule (40 mg total) by mouth daily. Take 1 capsule by mouth daily before breakfast. (Patient not taking: Reported on 2/12/2025) 30 capsule 11     Allergies:Allergies[1]    Past Medical History:    Esophageal reflux      Past Surgical History:   Procedure Laterality Date    Fracture surgery      Hernia repair      Hernia surgery      90s    Upper gi endoscopy,exam        Family History   Problem Relation Age of Onset    High Blood Pressure Father       Social History:   Social History     Socioeconomic History    Marital status:    Tobacco Use    Smoking status: Never     Passive exposure: Never    Smokeless tobacco: Never   Vaping Use    Vaping status: Never Used   Substance and Sexual Activity    Alcohol use: Yes     Comment: SOCIALLY    Drug use: Never        Objective:   Physical Exam  Vitals and nursing note reviewed.   Constitutional:       Appearance: He is well-developed.   HENT:      Head: Normocephalic and atraumatic.      Right Ear: External ear normal.      Left Ear: External ear normal.      Nose: Nose normal.   Eyes:      Conjunctiva/sclera: Conjunctivae normal.      Pupils: Pupils are equal, round, and reactive to light.   Cardiovascular:      Rate and Rhythm: Normal rate and regular rhythm.      Heart sounds: Normal heart sounds.   Pulmonary:      Effort: Pulmonary effort is normal.      Breath sounds: Normal breath sounds.   Abdominal:      General: Bowel sounds are normal.      Palpations: Abdomen is soft.   Genitourinary:     Penis: Normal.       Prostate: Normal.      Rectum: Normal.   Musculoskeletal:         General: Normal range of motion.      Cervical back: Normal range of motion and neck supple.   Skin:     General: Skin is warm and dry.   Neurological:      Mental Status: He is alert and oriented to person, place, and time.      Deep Tendon Reflexes: Reflexes are normal and symmetric.         Assessment & Plan:   1. Annual physical exam exam is okay will order labs   2.  Moderate asthma with exacerbation, unspecified whether persistent (HCC) stable taking as needed inhaler does not use it as often   3. Eosinophilic esophagitis recent diagnosis with EGD GI recommended patient to take PPI and possibly follow-up with an allergist patient has not been taking the PPI but says he will start taking it and will give referral to an allergist he supposed to follow back with GI 3 months from the prior EGD he says will do suspect on that   4. Chronic nonintractable headache, unspecified headache type see her neurologist told possible occipital neuralgia is not taking specific treatment right now   5. Flu vaccine refused    6.  Chronic low back pain with right-sided sciatica stable as need  medication.    No orders of the defined types were placed in this encounter.      Meds This Visit:  Requested Prescriptions     Signed Prescriptions Disp Refills    albuterol 108 (90 Base) MCG/ACT Inhalation Aero Soln 1 each 0     Sig: Inhale 2 puffs into the lungs every 6 (six) hours as needed for Wheezing.       Imaging & Referrals:  ALLERGY - INTERNAL            [1]   Allergies  Allergen Reactions    Ceclor ANAPHYLAXIS    Cephalosporins UNKNOWN

## 2025-02-18 ENCOUNTER — LAB ENCOUNTER (OUTPATIENT)
Dept: LAB | Age: 45
End: 2025-02-18
Attending: INTERNAL MEDICINE
Payer: COMMERCIAL

## 2025-02-18 DIAGNOSIS — Z28.21 FLU VACCINE REFUSED: ICD-10-CM

## 2025-02-18 DIAGNOSIS — R51.9 CHRONIC NONINTRACTABLE HEADACHE, UNSPECIFIED HEADACHE TYPE: ICD-10-CM

## 2025-02-18 DIAGNOSIS — K20.0 EOSINOPHILIC ESOPHAGITIS: ICD-10-CM

## 2025-02-18 DIAGNOSIS — J45.901 MODERATE ASTHMA WITH EXACERBATION, UNSPECIFIED WHETHER PERSISTENT (HCC): ICD-10-CM

## 2025-02-18 DIAGNOSIS — G89.29 CHRONIC NONINTRACTABLE HEADACHE, UNSPECIFIED HEADACHE TYPE: ICD-10-CM

## 2025-02-18 DIAGNOSIS — Z00.00 ANNUAL PHYSICAL EXAM: ICD-10-CM

## 2025-02-18 LAB
ALBUMIN SERPL-MCNC: 4.3 G/DL (ref 3.2–4.8)
ALBUMIN/GLOB SERPL: 1.8 {RATIO} (ref 1–2)
ALP LIVER SERPL-CCNC: 50 U/L
ALT SERPL-CCNC: 14 U/L
ANION GAP SERPL CALC-SCNC: 7 MMOL/L (ref 0–18)
AST SERPL-CCNC: 22 U/L (ref ?–34)
BASOPHILS # BLD AUTO: 0.05 X10(3) UL (ref 0–0.2)
BASOPHILS NFR BLD AUTO: 0.9 %
BILIRUB SERPL-MCNC: 0.9 MG/DL (ref 0.3–1.2)
BILIRUB UR QL: NEGATIVE
BUN BLD-MCNC: 12 MG/DL (ref 9–23)
BUN/CREAT SERPL: 11.7 (ref 10–20)
CALCIUM BLD-MCNC: 8.9 MG/DL (ref 8.7–10.4)
CHLORIDE SERPL-SCNC: 105 MMOL/L (ref 98–112)
CHOLEST SERPL-MCNC: 177 MG/DL (ref ?–200)
CLARITY UR: CLEAR
CO2 SERPL-SCNC: 29 MMOL/L (ref 21–32)
COLOR UR: COLORLESS
CREAT BLD-MCNC: 1.03 MG/DL
DEPRECATED RDW RBC AUTO: 41.7 FL (ref 35.1–46.3)
EGFRCR SERPLBLD CKD-EPI 2021: 91 ML/MIN/1.73M2 (ref 60–?)
EOSINOPHIL # BLD AUTO: 0.31 X10(3) UL (ref 0–0.7)
EOSINOPHIL NFR BLD AUTO: 5.3 %
ERYTHROCYTE [DISTWIDTH] IN BLOOD BY AUTOMATED COUNT: 12.1 % (ref 11–15)
FASTING PATIENT LIPID ANSWER: YES
FASTING STATUS PATIENT QL REPORTED: YES
GLOBULIN PLAS-MCNC: 2.4 G/DL (ref 2–3.5)
GLUCOSE BLD-MCNC: 93 MG/DL (ref 70–99)
GLUCOSE UR-MCNC: NORMAL MG/DL
HCT VFR BLD AUTO: 43.9 %
HDLC SERPL-MCNC: 51 MG/DL (ref 40–59)
HGB BLD-MCNC: 14.8 G/DL
HGB UR QL STRIP.AUTO: NEGATIVE
IMM GRANULOCYTES # BLD AUTO: 0 X10(3) UL (ref 0–1)
IMM GRANULOCYTES NFR BLD: 0 %
KETONES UR-MCNC: NEGATIVE MG/DL
LDLC SERPL CALC-MCNC: 114 MG/DL (ref ?–100)
LEUKOCYTE ESTERASE UR QL STRIP.AUTO: NEGATIVE
LYMPHOCYTES # BLD AUTO: 2.64 X10(3) UL (ref 1–4)
LYMPHOCYTES NFR BLD AUTO: 45.5 %
MCH RBC QN AUTO: 31.6 PG (ref 26–34)
MCHC RBC AUTO-ENTMCNC: 33.7 G/DL (ref 31–37)
MCV RBC AUTO: 93.8 FL
MONOCYTES # BLD AUTO: 0.55 X10(3) UL (ref 0.1–1)
MONOCYTES NFR BLD AUTO: 9.5 %
NEUTROPHILS # BLD AUTO: 2.25 X10 (3) UL (ref 1.5–7.7)
NEUTROPHILS # BLD AUTO: 2.25 X10(3) UL (ref 1.5–7.7)
NEUTROPHILS NFR BLD AUTO: 38.8 %
NITRITE UR QL STRIP.AUTO: NEGATIVE
NONHDLC SERPL-MCNC: 126 MG/DL (ref ?–130)
OSMOLALITY SERPL CALC.SUM OF ELEC: 291 MOSM/KG (ref 275–295)
PH UR: 6.5 [PH] (ref 5–8)
PLATELET # BLD AUTO: 202 10(3)UL (ref 150–450)
POTASSIUM SERPL-SCNC: 4.3 MMOL/L (ref 3.5–5.1)
PROT SERPL-MCNC: 6.7 G/DL (ref 5.7–8.2)
PROT UR-MCNC: NEGATIVE MG/DL
RBC # BLD AUTO: 4.68 X10(6)UL
SODIUM SERPL-SCNC: 141 MMOL/L (ref 136–145)
SP GR UR STRIP: 1.01 (ref 1–1.03)
TRIGL SERPL-MCNC: 64 MG/DL (ref 30–149)
TSI SER-ACNC: 2.49 UIU/ML (ref 0.55–4.78)
UROBILINOGEN UR STRIP-ACNC: NORMAL
VLDLC SERPL CALC-MCNC: 11 MG/DL (ref 0–30)
WBC # BLD AUTO: 5.8 X10(3) UL (ref 4–11)

## 2025-02-18 PROCEDURE — 84443 ASSAY THYROID STIM HORMONE: CPT

## 2025-02-18 PROCEDURE — 80061 LIPID PANEL: CPT

## 2025-02-18 PROCEDURE — 36415 COLL VENOUS BLD VENIPUNCTURE: CPT

## 2025-02-18 PROCEDURE — 81003 URINALYSIS AUTO W/O SCOPE: CPT

## 2025-02-18 PROCEDURE — 85025 COMPLETE CBC W/AUTO DIFF WBC: CPT

## 2025-02-18 PROCEDURE — 80053 COMPREHEN METABOLIC PANEL: CPT

## 2025-03-26 ENCOUNTER — HOSPITAL ENCOUNTER (EMERGENCY)
Facility: HOSPITAL | Age: 45
Discharge: HOME | End: 2025-03-26
Attending: EMERGENCY MEDICINE
Payer: COMMERCIAL

## 2025-03-26 VITALS
BODY MASS INDEX: 25.2 KG/M2 | TEMPERATURE: 99 F | RESPIRATION RATE: 18 BRPM | HEART RATE: 60 BPM | WEIGHT: 180 LBS | HEIGHT: 71 IN | SYSTOLIC BLOOD PRESSURE: 161 MMHG | DIASTOLIC BLOOD PRESSURE: 87 MMHG | OXYGEN SATURATION: 99 %

## 2025-03-26 DIAGNOSIS — M54.42 ACUTE LEFT-SIDED LOW BACK PAIN WITH LEFT-SIDED SCIATICA: Primary | ICD-10-CM

## 2025-03-26 LAB
BILIRUB UR QL: NEGATIVE
COLOR UR: YELLOW
GLUCOSE UR-MCNC: NORMAL MG/DL
HGB UR QL STRIP.AUTO: NEGATIVE
KETONES UR-MCNC: NEGATIVE MG/DL
LEUKOCYTE ESTERASE UR QL STRIP.AUTO: NEGATIVE
NITRITE UR QL STRIP.AUTO: NEGATIVE
PH UR: 7 [PH] (ref 5–8)
PROT UR-MCNC: NEGATIVE MG/DL
SP GR UR STRIP: 1.02 (ref 1–1.03)
UROBILINOGEN UR STRIP-ACNC: NORMAL

## 2025-03-26 PROCEDURE — 99283 EMERGENCY DEPT VISIT LOW MDM: CPT

## 2025-03-26 PROCEDURE — 99284 EMERGENCY DEPT VISIT MOD MDM: CPT

## 2025-03-26 PROCEDURE — 81001 URINALYSIS AUTO W/SCOPE: CPT | Performed by: EMERGENCY MEDICINE

## 2025-03-26 RX ORDER — METHYLPREDNISOLONE 4 MG/1
TABLET ORAL
Qty: 21 TABLET | Refills: 0 | Status: SHIPPED | OUTPATIENT
Start: 2025-03-26

## 2025-03-27 NOTE — ED QUICK NOTES
Pt discharged. Pt verbalized understanding of discharge instructions and will  prescription at pharmacy listed on discharge papers. Pt A/O x4, speaking complete sentences, and ambulates with a steady gait. All questions answered at this time.

## 2025-03-27 NOTE — ED PROVIDER NOTES
Patient Seen in: [facility] Emergency Department    History     Chief Complaint   Patient presents with    Back Pain       HPI    44-year-old male with a history of chronic low back pain who yesterday was attempting to assemble a portable basketball goal and felt a mild worsening of his low back pain, reports that he woke up this morning with a mild amount of urine that appeared to be in his underwear.  He otherwise denies any urinary incontinence.  He denies any weakness or numbness.  Reports a trace amount of paresthesias at the left inguinal.  Denies any fevers or any midline back pain.  No bowel incontinence.  No changes to his gait.    History reviewed.   Past Medical History:    Esophageal reflux       History reviewed.   Past Surgical History:   Procedure Laterality Date    Fracture surgery      Hernia repair      Hernia surgery      90s    Upper gi endoscopy,exam           Medications :  Prescriptions Prior to Admission[1]     Family History   Problem Relation Age of Onset    High Blood Pressure Father        Smoking Status:   Social History     Socioeconomic History    Marital status:    Tobacco Use    Smoking status: Never     Passive exposure: Never    Smokeless tobacco: Never   Vaping Use    Vaping status: Never Used   Substance and Sexual Activity    Alcohol use: Yes     Comment: SOCIALLY    Drug use: Never       Constitutional and vital signs reviewed.      Social History and Family History elements reviewed from today, pertinent positives to the presenting problem noted.    Physical Exam     ED Triage Vitals [03/26/25 1942]   BP (!) 161/87   Pulse 60   Resp 16   Temp 98.7 °F (37.1 °C)   Temp src Temporal   SpO2 100 %   O2 Device None (Room air)       All measures to prevent infection transmission during my interaction with the patient were taken. The patient was already wearing a droplet mask on my arrival to the room. Personal protective equipment was worn throughout the duration of  the exam.  Handwashing was performed prior to and after the exam.  Stethoscope and any equipment used during my examination was cleaned with super sani-cloth germicidal wipes following the exam.     Physical Exam    General: NAD  Head: Normocephalic and atraumatic.  Mouth/Throat/Ears/Nose: Oropharynx is clear and moist.   Eyes: Conjunctivae and EOM are normal.   Neck: Normal range of motion. Supple.   Cardiovascular: Normal rate, regular rhythm, normal heart sounds.  Respiratory/Chest: Clear and equal bilaterally. Exhibits no tenderness.  Gastrointestinal: Soft, non-tender, non-distended. Bowel sounds are normal.   Musculoskeletal:No swelling or deformity.   Neurological: Alert and appropriate. No focal deficits. AOx4  CN II-XII grossly intact  5/5 strength: Left  strength, deltoid abduction, achilles, patella  5/5 strength: Right  strength, deltoid abduction, achilles, patella   SILT to bilateral upper and lower extremities   normal gait . Normal  HTS     INÉS: normal rectal tone. No saddle anesthesia    Skin: Skin is warm and dry. No pallor.  Psychiatric: Has a normal mood and affect.      ED Course        Labs Reviewed   URINALYSIS WITH CULTURE REFLEX - Abnormal; Notable for the following components:       Result Value    Clarity Urine Turbid (*)     All other components within normal limits       As Interpreted by me    Imaging Results Available and Reviewed while in ED: No results found.  ED Medications Administered: Medications - No data to display      MDM     Vitals:    03/26/25 1942 03/26/25 2156   BP: (!) 161/87 (!) 161/87   Pulse: 60 60   Resp: 16 18   Temp: 98.7 °F (37.1 °C) 98.7 °F (37.1 °C)   TempSrc: Temporal Oral   SpO2: 100% 99%   Weight: 81.6 kg    Height: 180.3 cm (5' 11\")      *I personally reviewed and interpreted all ED vitals.    Pulse Ox: 100%, Room air, Normal          Medical Decision Making      Differential Diagnosis/ Diagnostic Considerations: Cauda equina, cord compression,  lumbosacral strain    Complicating Factors: The patient already has sciatica to contribute to the complexity of this ED evaluation.    I reviewed prior chart records including visit note from February 12, 2025.  Patient here with low back pain, normal rectal exam, normal neurologic exam, postvoid residual less than 100 mL normal and approximately 80 mL.  He has no red flags or neurologic deficits to suggest cauda equina or cord compression.  He is discharged to home in stable condition.  Medrol Dosepak prescribed.  Physical medicine referral provided and he was told to follow-up closely with his spine surgeon as well.    Dc In stable condition.  Patient is comfortable with the plan.    Prescriptions: As above      Disposition and Plan     Clinical Impression:  1. Acute left-sided low back pain with left-sided sciatica        Disposition:  Discharge    Follow-up:  Shane Eric Y, DO  [address] [facility] [address] [place] [id number]  [phone]    Schedule an appointment as soon as possible for a visit in 1 day(s)        Medications Prescribed:  Discharge Medication List as of 3/26/2025  9:57 PM        START taking these medications    Details   methylPREDNISolone 4 MG Oral Tablet Therapy Pack Take as directed on dose pack instructions, Normal, Disp-21 tablet, R-0                              [1] (Not in a hospital admission)

## 2025-03-27 NOTE — ED INITIAL ASSESSMENT (HPI)
Pt to the emergency room for lower back pain due to herniated disk. Pt states that he hurt himself 2 days ago while lifting a portable basketball hoop. Pt states that feels that this morning he woke up with \"a wet penis\". When asked to clarify pt states that this was a one time event, but since then pt has been feeling more frequency and urgency. Pt adds that he feels \"pins and needles to his lower back, groin, penis areas. No other concerns per pt.

## 2025-05-05 ENCOUNTER — OFFICE VISIT (OUTPATIENT)
Dept: INTERNAL MEDICINE CLINIC | Facility: CLINIC | Age: 45
End: 2025-05-05
Payer: COMMERCIAL

## 2025-05-05 VITALS
OXYGEN SATURATION: 99 % | SYSTOLIC BLOOD PRESSURE: 130 MMHG | WEIGHT: 171 LBS | TEMPERATURE: 98 F | HEIGHT: 71 IN | BODY MASS INDEX: 23.94 KG/M2 | DIASTOLIC BLOOD PRESSURE: 80 MMHG | RESPIRATION RATE: 17 BRPM | HEART RATE: 77 BPM

## 2025-05-05 DIAGNOSIS — R35.0 FREQUENT URINATION: ICD-10-CM

## 2025-05-05 DIAGNOSIS — R30.0 DYSURIA: Primary | ICD-10-CM

## 2025-05-05 DIAGNOSIS — E78.5 HYPERLIPIDEMIA, UNSPECIFIED HYPERLIPIDEMIA TYPE: ICD-10-CM

## 2025-05-05 LAB
APPEARANCE: CLEAR
BILIRUB UR QL: NEGATIVE
BILIRUBIN: NEGATIVE
CLARITY UR: CLEAR
COLOR UR: COLORLESS
GLUCOSE (URINE DIPSTICK): NEGATIVE MG/DL
GLUCOSE UR-MCNC: NORMAL MG/DL
HGB UR QL STRIP.AUTO: NEGATIVE
KETONES (URINE DIPSTICK): 15 MG/DL
KETONES UR-MCNC: 10 MG/DL
LEUKOCYTE ESTERASE UR QL STRIP.AUTO: NEGATIVE
LEUKOCYTES: NEGATIVE
MULTISTIX LOT#: ABNORMAL NUMERIC
NITRITE UR QL STRIP.AUTO: NEGATIVE
NITRITE, URINE: NEGATIVE
OCCULT BLOOD: NEGATIVE
PH UR: 5.5 [PH] (ref 5–8)
PH, URINE: 5.5 (ref 4.5–8)
PROT UR-MCNC: NEGATIVE MG/DL
PROTEIN (URINE DIPSTICK): NEGATIVE MG/DL
SP GR UR STRIP: 1 (ref 1–1.03)
SPECIFIC GRAVITY: <=1.005 (ref 1–1.03)
URINE-COLOR: YELLOW
UROBILINOGEN UR STRIP-ACNC: NORMAL
UROBILINOGEN,SEMI-QN: 0.2 MG/DL (ref 0–1.9)

## 2025-05-05 PROCEDURE — 99214 OFFICE O/P EST MOD 30 MIN: CPT | Performed by: INTERNAL MEDICINE

## 2025-05-05 PROCEDURE — 3008F BODY MASS INDEX DOCD: CPT | Performed by: INTERNAL MEDICINE

## 2025-05-05 PROCEDURE — 81003 URINALYSIS AUTO W/O SCOPE: CPT | Performed by: INTERNAL MEDICINE

## 2025-05-05 PROCEDURE — 3075F SYST BP GE 130 - 139MM HG: CPT | Performed by: INTERNAL MEDICINE

## 2025-05-05 PROCEDURE — 3079F DIAST BP 80-89 MM HG: CPT | Performed by: INTERNAL MEDICINE

## 2025-05-10 NOTE — PROGRESS NOTES
Subjective:     Patient ID: Royal Cano is a 44 year old male.    HPI  Patient comes in today with complaint of having some frequent urination and some discomfort worried about UTI no blood no flank pain no fever  History/Other:   Review of Systems   Constitutional: Negative.  Negative for fever.   HENT: Negative.     Eyes: Negative.    Respiratory: Negative.     Cardiovascular: Negative.    Gastrointestinal: Negative.    Genitourinary:  Positive for dysuria and frequency. Negative for flank pain.   Musculoskeletal: Negative.    Skin: Negative.    Neurological: Negative.    Psychiatric/Behavioral: Negative.       Current Medications[1]  Allergies:Allergies[2]    Past Medical History[3]   Past Surgical History[4]   Family History[5]   Social History: Short Social Hx on File[6]     Objective:   Physical Exam  Vitals and nursing note reviewed.   Constitutional:       Appearance: He is well-developed.   HENT:      Head: Normocephalic and atraumatic.      Right Ear: External ear normal.      Left Ear: External ear normal.      Nose: Nose normal.   Eyes:      Conjunctiva/sclera: Conjunctivae normal.      Pupils: Pupils are equal, round, and reactive to light.   Cardiovascular:      Rate and Rhythm: Normal rate and regular rhythm.      Heart sounds: Normal heart sounds.   Pulmonary:      Effort: Pulmonary effort is normal.      Breath sounds: Normal breath sounds.   Abdominal:      General: Bowel sounds are normal.      Palpations: Abdomen is soft.   Genitourinary:     Penis: Normal.       Prostate: Normal.      Rectum: Normal.   Musculoskeletal:         General: Normal range of motion.      Cervical back: Normal range of motion and neck supple.   Skin:     General: Skin is warm and dry.   Neurological:      Mental Status: He is alert and oriented to person, place, and time.      Deep Tendon Reflexes: Reflexes are normal and symmetric.         Assessment & Plan:   1. Dysuria urine dip looks normal will send for  culture will follow results drink fluids monitor Shari medication for pain if symptoms persist or continue or any worsening symptoms let us know   2. Frequent urination as above will follow results if symptoms persist will need to see urology   3. Hyperlipidemia, unspecified hyperlipidemia type will retest watch diet will call  with results       Orders Placed This Encounter   Procedures    POC Urinalysis, Automated Dip without microscopy (PCA and EMM[facility] ONLY) [75159]    UA/M WITH CULTURE REFLEX [3020]    Lipid Panel       Meds This Visit:  Requested Prescriptions      No prescriptions requested or ordered in this encounter       Imaging & Referrals:  None            [1]   Current Outpatient Medications   Medication Sig Dispense Refill    albuterol 108 (90 Base) MCG/ACT Inhalation Aero Soln Inhale 2 puffs into the lungs every 6 (six) hours as needed for Wheezing. 1 each 0    Omeprazole 40 MG Oral Capsule Delayed Release Take 1 capsule (40 mg total) by mouth daily. Take 1 capsule by mouth daily before breakfast. 30 capsule 11    methylPREDNISolone 4 MG Oral Tablet Therapy Pack Take as directed on dose pack instructions (Patient not taking: Reported on 5/5/2025) 21 tablet 0   [2]   Allergies  Allergen Reactions    Ceclor ANAPHYLAXIS    Cephalosporins UNKNOWN   [3]   Past Medical History:   Allergic rhinitis    Anal fissure    Minor tear and irritation, currently not a problem    Asthma (HCC)    Esophageal reflux    IBS (irritable bowel syndrome)    Currently messy, soft poops. Soon after eating.   [4]   Past Surgical History:  Procedure Laterality Date    Colonoscopy  2017    Fracture surgery      Hernia repair      Hernia surgery      90s    Other surgical history  1998    Broken leg    Upper gi endoscopy,exam     [5]   Family History  Problem Relation Age of Onset    High Blood Pressure Father     Hypertension Father     Cancer Maternal Grandfather     Cancer Paternal Grandmother    [6]   Social History  Socioeconomic  History    Marital status:    Tobacco Use    Smoking status: Never     Passive exposure: Never    Smokeless tobacco: Never   Vaping Use    Vaping status: Never Used   Substance and Sexual Activity    Alcohol use: Yes     Comment: SOCIALLY    Drug use: Never     Social Drivers of Health     Food Insecurity: No Food Insecurity (3/26/2025)    Received from [facility]    Hunger Vital Sign     Worried About Running Out of Food in the Last Year: Never true     Ran Out of Food in the Last Year: Never true   Transportation Needs: No Transportation Needs (3/26/2025)    Received from [facility]    PRAPARE - Transportation     Lack of Transportation (Medical): No     Lack of Transportation (Non-Medical): No   Housing Stability: Unknown (3/26/2025)    Received from [facility]    Housing Stability Vital Sign     Unable to Pay for Housing in the Last Year: No

## 2025-05-22 ENCOUNTER — NURSE TRIAGE (OUTPATIENT)
Dept: INTERNAL MEDICINE CLINIC | Facility: CLINIC | Age: 45
End: 2025-05-22

## 2025-05-22 NOTE — TELEPHONE ENCOUNTER
Patient stated that he see that his urine results were normal. Patient is still having he same disconfort when he is urinating. States especially when he is not drinking enough fluids feels burning sensation when urinating. Patient stated that doctor mentioned that the urologist might be the next step. Wanted to get a referral. Patient does have PPO insurance so no referral needed. Patient stated that he has a urologist in mind so will schedule an appointment. Advised patient that if not able to get in to let us know and will provide number to our urology department. Patient agreed.     Any other recommendations?

## (undated) DIAGNOSIS — R10.10 UPPER ABDOMINAL PAIN: ICD-10-CM

## (undated) DIAGNOSIS — K21.9 GASTROESOPHAGEAL REFLUX DISEASE, UNSPECIFIED WHETHER ESOPHAGITIS PRESENT: Primary | ICD-10-CM

## (undated) DIAGNOSIS — R13.10 DYSPHAGIA, UNSPECIFIED TYPE: ICD-10-CM

## (undated) DIAGNOSIS — R19.4 CHANGE IN BOWEL HABITS: ICD-10-CM

## (undated) NOTE — LETTER
3/16/2023              [place] [address]         Dear Víctor Raygoza records indicate that the tests ordered for you by Juan Manuel Negrete  have not been done. If you have, in fact, already completed the tests or you do not wish to have the tests done, please contact our office at [address] [place] [address]. Otherwise, please proceed with the testing. Enclosed is a duplicate order for your convenience. Please call Central Scheduling at [phone] to schedule this test order        [address] (CPT=[id number]) ([id number]) [[id number]] (Order [id number])            Sincerely,    Juan Manuel Negrete  [facility] [address]  [place] , [address] [address] [phone]